# Patient Record
Sex: FEMALE | Race: WHITE
[De-identification: names, ages, dates, MRNs, and addresses within clinical notes are randomized per-mention and may not be internally consistent; named-entity substitution may affect disease eponyms.]

---

## 2016-12-26 NOTE — DIAGNOSTIC IMAGING REPORT
CT OF THE HEAD WITHOUT CONTRAST



CLINICAL HISTORY: Overdose.    



COMPARISON STUDY:  Head CT September 2, 2016. 



CT DOSE: 601.98 mGy.cm



TECHNIQUE: Helical axial images of the head were obtained without IV contrast.

Automated exposure control was utilized for the study.



FINDINGS: No acute intracranial hemorrhage, midline shift or mass effect is

present. Ventricular system is normal. The basilar cisterns are patent. There

are no extra-axial collections. Gray-white differentiation is maintained. There

are no findings to suggest acute dural sinus thrombosis or acute territorial

infarct. There is no calvarial fracture. Visualized portions of the sinuses and

mastoid air cells are clear.



IMPRESSION:  No acute intracranial findings. 







Electronically signed by:  Riky Leija M.D.

12/26/2016 7:03 PM

## 2016-12-26 NOTE — DIAGNOSTIC IMAGING REPORT
CHEST ONE VIEW PORTABLE



CLINICAL HISTORY: Overdose.    



COMPARISON STUDY:  Chest CT October 9, 2011



FINDINGS: There are cholecystectomy clips. No pneumothorax or pleural effusion

is present. No consolidation is identified and there is no evidence of pulmonary

edema. Cardiomediastinal silhouette is normal. 



IMPRESSION:  No acute cardiopulmonary findings. 









Electronically signed by:  Riky Leija M.D.

12/26/2016 6:23 PM
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
## 2016-12-27 NOTE — NEUROLOGY CONSULTATION
Neurology Consultation


Date of Consultation:


Dec 27, 2016.


Attending Physician:


Kaci Logan MD


Primary Care Physician:


Keo Carr D.O.


Reason for Consultation:


chronic headache


History of Present Illness


Source:  patient


Sussy is seen in the psychiatry unit for recurrent depression, PTSD, 

generalized anxiety and panic disorder as well as cannabis and opiate use 

disorders. She is diagnosed with personality disorder. She was seen in the ED 

due to an overdose attempt on  Ativan.  She had attempted to swallow about 20 

tablets and then her boyfriend grabbed the bottle and then she spit out the 

ones in her mouth. She feels the chronic headaches an pain are not tolerable 

and she can't live this way. She last saw Dr Vargas in Neurology in September 

and no new medications were started. She has had the most relief from the 

combination of Ultram 50 mg and Robomol 750 QID. She states last month the 

Robomol was changed to baclofen which she thinks does not help. Dr Vargas had 

set her up for pain mgt and EEG for further management but she didn't follow 

through with the visits stating she was in too much pain to go to the 

appointments. She states the steroids were tried in the past with little 

effect. It actually escalated the psychiatric issues. Currently she has a 

headache that is stabbing behind her eye and it wraps around to the front of 

her face and jaw. She states she fell 2 months ago and hit her head 3 times 

while falling down the stairs. denies CP, SOB, abdominal pain, numbness tingling

, swallowing issues. +vision changes (blurred) but she does not have her glasses

, headaches behind left eye, + generalized pain, + 20 pound weight loss.





Past Medical/Surgical History


Medical Problems:


(1) Chronic pain


Status: Acute  





(2) Chronic pain


Status: Acute  





(3) Depression


Status: Acute  





(4) Migraine


Status: Acute  





(5) Post traumatic stress disorder


Status: Acute  





(6) Serous otitis media


Status: Acute  





(7) Suicidal ideation


Status: Acute  





(8) Suicidal ideation


Status: Acute  





(9) Suicide gesture


Status: Acute  











Social History


Smoking Status:  Never smoker


Drug Use:  none


Marital Status:  single


Occupation Status:  [employer] student





Allergies


Coded Allergies:  


     Roaring Spring (Verified  Allergy, Severe, VOMITING,ITCHY TONGUE,FACE SWELLING, 12/ 27/16)


     Macadamia Nut Oil (Verified  Allergy, Severe, RASH,VOMITING,ITCHY TONGUE,

FACE SWELLING, 12/27/16)


     Valproic Acid (Verified  Allergy, Severe, TONGUE SWELLING, 12/27/16)


     Amoxicillin (Verified  Allergy, Intermediate, BREAKOUT, 12/27/16)


     Ciprofloxacin (Verified  Allergy, Intermediate, WELTS/SWELLING, 12/27/16)


     Clavulanic Acid (Verified  Allergy, Intermediate, BREAKOUT, 12/27/16)


     Pineapple (Verified  Allergy, Intermediate, VOMITING, ITCHY TONGUE, 12/27/ 16)


     Iodine (Verified  Adverse Reaction, Intermediate, VOMITING, 12/27/16)


     Erythromycin (Verified  Adverse Reaction, Mild, NAUSEA, 12/26/16)


     Iodinated Contrast Media (Verified  Adverse Reaction, Mild, VOMITING, 12/27 /16)





Current Inpatient Medications





 Current Inpatient Medications








 Medications


  (Trade)  Dose


 Ordered  Sig/Evelia


 Route  Start Time


 Stop Time Status Last Admin


Dose Admin


 


 Acetaminophen


  (Tylenol Tab)  650 mg  Q4H  PRN


 PO  12/27/16 02:30


 1/26/17 02:29   


 


 


 Al Hydroxide/Mg


 Hydroxide


  (Maalox Susp)  30 ml  Q4H  PRN


 PO  12/27/16 02:30


 1/26/17 02:29   


 


 


 Bismuth


 Subsalicylate


  (Kaopectate Liqd)  15 ml  DAILY  PRN


 PO  12/27/16 02:30


 1/26/17 02:29   


 


 


 Magnesium


 Hydroxide


  (Milk Of


 Magnesia Susp)  30 ml  DAILY  PRN


 PO  12/27/16 02:30


 1/26/17 02:29   


 


 


 Sodium Chloride


  (Ocean Nasal


 Spray)    PRN  PRN


 NA  12/27/16 02:30


 1/26/17 02:29   


 


 


 Hydroxyzine HCl


  (Vistaril Tab)  50 mg  HSZ  PRN


 PO  12/27/16 02:30


 1/26/17 02:29   


 


 


 Hydroxyzine HCl


  (Vistaril Tab)  25 mg  Q4H  PRN


 PO  12/27/16 02:30


 1/26/17 02:29   


 


 


 Prazosin HCl


  (Prazosin)  1 mg  HS


 PO  12/27/16 22:00


 1/26/17 21:59   


 


 


 Ondansetron HCl


  (Zofran Tab)  4 mg  Q6H  PRN


 PO  12/27/16 02:30


 1/26/17 02:29   


 


 


 Baclofen


  (Lioresal Tab)  10 mg  TID


 PO  12/27/16 09:00


 1/26/17 08:59  12/27/16 09:38


10 MG


 


 Tramadol HCl


  (Ultram Tab)  50 mg  Q6H  PRN


 PO  12/27/16 02:30


 1/26/17 02:29  12/27/16 09:43


50 MG


 


 Haloperidol


 Lactate


  (Haldol Inj)  5 mg  Q4  PRN


 IM  12/27/16 09:30


 1/26/17 09:29   


 


 


 Haloperidol


  (Haldol Tab)  5 mg  Q4H  PRN


 PO  12/27/16 09:30


 1/26/17 09:29   


 


 


 Lorazepam


  (Ativan Inj)  1 mg  Q4H  PRN


 IM  12/27/16 09:30


 1/26/17 09:29   


 


 


 Lorazepam


  (Ativan Tab)  1 mg  Q4  PRN


 PO  12/27/16 09:30


 1/26/17 09:29   


 











Physical Exam


Vital Signs (Past 24 Hrs):











  Date Time  Temp Pulse Resp B/P Pulse Ox O2 Delivery O2 Flow Rate FiO2


 


12/27/16 06:50 36.5 86 16 106/67    


 


12/27/16 00:45 36.9 105 18 107/68    


 


12/27/16 00:35  64 16 134/79 95 Room Air  


 


12/26/16 23:11  64      


 


12/26/16 19:54  65 18 108/54 96 Room Air  


 


12/26/16 19:42  65      


 


12/26/16 18:53     99 Room Air  


 


12/26/16 18:53  70 18 113/53 99 Room Air  


 


12/26/16 17:20 37.1 81 18 124/81 99 Room Air  








Physical Exam:


Constitutional:  appearance nourished, healthy and normal


Ears, Nose, Mouth and Throat: mucous membranes moist, no injection and skin 

normal, eyes normal


Cardiovascular: normal S-1 and S-2 and regular rate and rhythm


Respiratory: clear to auscultation (CTA) and no rales, rhonchi or wheeze


Musculoskeletal: no peripheral edema and good distal pulses


Skin: no stigmata of neurocutaneous disease noted and normal and intact


Eyes: extraocular muscles intact (EOMI) and pupils equal, round and reactive to 

light (PERRL), pupils dilated, good vascular pulsation disc flat





NEUROLOGIC EXAMINATION: 


Mental status: 


Alert and interactive


Oriented to full date and location


Oriented to person


Speech fluent with no evidence of aphasia





Cranial Nerves


smile eye brow raise symmetric, tongue midline





Reflexes:


Deep tendon reflexes were symmetrical and graded 2/5.  Plantar responses were 

flexor.





Sensory: 


no sensory deficits with vibration, cool touch





Coordination: 


Romberg absent





Gait/Stance:


Posture normal.  Gait normal: with steady with steps, base, turning, and tandem 

gait.





Motor:


Negative for pronator drift of out stretched arms with eyes closed.





Strength:


biceps triceps hand  4/5 bilaterally (no good effort) hip flex bilaterally 4

/5 halted by pain, plantar flex ext bilaterally 5/5





Laboratory Results


Past 24 Hours:


12/26/16 17:50








Red Blood Count 4.67, Mean Corpuscular Volume 89.1, Mean Corpuscular Hemoglobin 

30.2, Mean Corpuscular Hemoglobin Concent 33.9, Mean Platelet Volume 9.6, 

Neutrophils (%) (Auto) 86.8, Lymphocytes (%) (Auto) 8.4, Monocytes (%) (Auto) 

4.2, Eosinophils (%) (Auto) 0.2, Basophils (%) (Auto) 0.2, Neutrophils # (Auto) 

11.22, Lymphocytes # (Auto) 1.09, Monocytes # (Auto) 0.54, Eosinophils # (Auto) 

0.02, Basophils # (Auto) 0.03





12/26/16 17:50

















Test


  12/26/16


17:50 12/26/16


17:52 12/26/16


17:55 12/26/16


17:57


 


White Blood Count


  12.92 K/uL


(4.8-10.8) 


  


  


 


 


Red Blood Count


  4.67 M/uL


(4.2-5.4) 


  


  


 


 


Hemoglobin


  14.1 g/dL


(12.0-16.0) 


  


  


 


 


Hematocrit 41.6 % (37-47)    


 


Mean Corpuscular Volume


  89.1 fL


() 


  


  


 


 


Mean Corpuscular Hemoglobin


  30.2 pg


(25-34) 


  


  


 


 


Mean Corpuscular Hemoglobin


Concent 33.9 g/dl


(32-36) 


  


  


 


 


Platelet Count


  307 K/uL


(130-400) 


  


  


 


 


Mean Platelet Volume


  9.6 fL


(7.4-10.4) 


  


  


 


 


Neutrophils (%) (Auto) 86.8 %    


 


Lymphocytes (%) (Auto) 8.4 %    


 


Monocytes (%) (Auto) 4.2 %    


 


Eosinophils (%) (Auto) 0.2 %    


 


Basophils (%) (Auto) 0.2 %    


 


Neutrophils # (Auto)


  11.22 K/uL


(1.4-6.5) 


  


  


 


 


Lymphocytes # (Auto)


  1.09 K/uL


(1.2-3.4) 


  


  


 


 


Monocytes # (Auto)


  0.54 K/uL


(0.11-0.59) 


  


  


 


 


Eosinophils # (Auto)


  0.02 K/uL


(0-0.5) 


  


  


 


 


Basophils # (Auto)


  0.03 K/uL


(0-0.2) 


  


  


 


 


RDW Standard Deviation


  43.6 fL


(36.4-46.3) 


  


  


 


 


RDW Coefficient of Variation


  13.5 %


(11.5-14.5) 


  


  


 


 


Immature Granulocyte % (Auto) 0.2 %    


 


Immature Granulocyte # (Auto)


  0.02 K/uL


(0.00-0.02) 


  


  


 


 


Anion Gap


  11.0 mmol/L


(3-11) 


  


  


 


 


Est Creatinine Clear Calc


Drug Dose 102.2 ml/min 


  


  


  


 


 


Estimated GFR (


American) 93.3 


  


  


  


 


 


Estimated GFR (Non-


American 80.5 


  


  


  


 


 


BUN/Creatinine Ratio 13.8 (10-20)    


 


Osmolality


  290 mOsm/kg


(280-300) 


  


  


 


 


Calcium Level


  8.5 mg/dl


(8.5-10.1) 


  


  


 


 


Total Bilirubin


  0.7 mg/dl


(0.2-1) 


  


  


 


 


Direct Bilirubin


  0.1 mg/dl


(0-0.2) 


  


  


 


 


Aspartate Amino Transf


(AST/SGOT) 12 U/L (15-37) 


  


  


  


 


 


Alanine Aminotransferase


(ALT/SGPT) 14 U/L (12-78) 


  


  


  


 


 


Alkaline Phosphatase


  63 U/L


() 


  


  


 


 


Total Creatine Kinase


  34 U/L


() 


  


  


 


 


Creatine Kinase MB


  < 0.5 ng/ml


(0.5-3.6) 


  


  


 


 


Creatine Kinase MB Ratio  (0-3.0)    


 


Troponin I


  < 0.015 ng/ml


(0-0.045) 


  


  


 


 


Total Protein


  7.9 gm/dl


(6.4-8.2) 


  


  


 


 


Albumin


  4.1 gm/dl


(3.4-5.0) 


  


  


 


 


Lipase


  124 U/L


() 


  


  


 


 


Human Chorionic Gonadotropin,


Qual NEG (NEG) 


  


  


  


 


 


Salicylates Level


  < 1.7 mg/dl


(2.8-20) 


  


  


 


 


Acetaminophen Level


  < 2 ug/ml


(10-30) 


  


  


 


 


Ethyl Alcohol mg/dL


  < 3.0 mg/dl


(0-3) 


  


  


 


 


Prothrombin Time


  


  10.4 SECONDS


(9.0-12.0) 


  


 


 


Prothromb Time International


Ratio 


  1.0 (0.9-1.1) 


  


  


 


 


Activated Partial


Thromboplast Time 


  25.8 SECONDS


(21.0-31.0) 


  


 


 


Partial Thromboplastin Ratio  1.0   


 


Urine Color   YELLOW  


 


Urine Appearance   CLEAR (CLEAR)  


 


Urine pH   6.0 (4.5-7.5)  


 


Urine Specific Gravity


  


  


  1.011


(1.000-1.030) 


 


 


Urine Protein   NEG (NEG)  


 


Urine Glucose (UA)   NEG (NEG)  


 


Urine Ketones   NEG (NEG)  


 


Urine Occult Blood   NEG (NEG)  


 


Urine Nitrite   NEG (NEG)  


 


Urine Bilirubin   NEG (NEG)  


 


Urine Urobilinogen   NEG (NEG)  


 


Urine Leukocyte Esterase   NEG (NEG)  


 


Urine Opiates Screen   NEG (NEG)  


 


Urine Methadone, Qualitative   NEG (NEG)  


 


Urine Barbiturates   NEG (NEG)  


 


Urine Phencyclidine (PCP)


Level 


  


  NEG (NEG) 


  


 


 


Ur


Amphetamine/Methamphetamine 


  


  NEG (NEG) 


  


 


 


MDMA (Ecstasy) Screen   NEG (NEG)  


 


Urine Benzodiazepines Screen   NEG (NEG)  


 


Urine Cocaine Metabolite   NEG (NEG)  


 


Urine Marijuana (THC)   POS (NEG)  











Imaging


CT head- No acute intracranial findings.





Impression


41 year old female with psychiatric issues, chronic pain, migraine headaches





Plan


1. no further imaging needed- full work up as outpatient for headaches


2. adding additional medication (as she had tried most available) does not seem 

like a good option at this point


3. EEG - to r/o any seizure focus to headaches and facial phenomenon


4. pain mgt -would recommend injection of trigger points or Botox may be an 

option


5. possible referral to headache specialist as an out patient


6. further recommendations to follow. 





I have seen and discussed above patient with Dr Isreal Vargas, neurology





I have seen this patient on a single outpatient visit in September and felt 

than that her chronic headache issue would not be solved by yet another 

medication trial  ( she has been on over 40 medications for treatment of her 

depression and headache issues and to date none has been effective ( other than 

self medication with marijuana ) and she has multiple comorbidities as well 

that interfere with the medical model of headache treatment   I was planning to 

have her evaluated for potential injection management  ( possibly botox ) and 

was going to get an eeg to readdress some of the potential seizure disorder 

questions that have been raised periodically but these plans never came to 

fruition due to multiple other issues not the least wof which was a mild closed 

head trauma in the mid fall of this year.  I felt that she was going to need a 

tertiary level of care but her options for this are somewhat limited by 

insurance and by access within [facility] [employer] system  At this time agree with 

having pain management evaluate her, getting an eeg, and considering a trial fo 

hig dose then rapidly tapering steroids if psychiatry would feel it safe in 

this iinpatient setting  We will drop by tomorrow to assess ow things have 

evolved and will read the eeg when done  I agree with the plans outlined by 

Parul JAUREGUI and have discussed them with her  Israel Vargas MD
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
## 2016-12-27 NOTE — EMERGENCY ROOM VISIT NOTE
ED Visit Note


First contact with patient:  23:29


41 yr old female medically cleared and signed out to me by Dr Godoy awaiting 

[facility] placement following attempted overdose on her ativan witnessed by 

significant other.  Evaluated at sign out and getting evening medications.  [facility] accepted her to their facility and I signed 201.  She was stable and 

transferred up there without issue.

## 2016-12-28 NOTE — ELECTROENCEPHALOGRAPH REPORT
CLINICAL DIAGNOSIS:  Chronic daily increasingly severe headaches, vague

history of syncopal events in the past.  Question seizures.

 

EEG DIAGNOSIS:  Essentially normal during wakefulness.

 

DESCRIPTION OF TRACING:  This EEG was done as a bedside recording with

simultaneous video analysis of patient's movement and behavior.  The tracing

is of good technical quality with few or no muscle or movement artifacts. 

Photic stimulation was performed.

 

During wakefulness, there is evidence for a normal background rhythm in the

alpha range of up to 10 Hz of maximum frequency and 30 microvolts of maximum

amplitude.  This is maximum in posterior head regions bilaterally

symmetrical.  Polymorphic mid to lower frequency theta activity is seen over

all head regions without clear focal or regional predominance.  Anterior head

region maximum bilaterally symmetrical low voltage fast activity in the beta

range is present.

 

Photic stimulation provokes a modest driving response without a photomyogenic

or photoparoxysmal  component.

 

At no time during the waking tracing is there evidence for potentially

epileptogenic activity in the form of polyspike or spike wave bursts, focal

sharp waves or focal spikes.

 

INTERPRETATION:  This EEG is essentially normal during wakefulness without

evidence for a focal or generalized encephalopathy and without evidence for

potentially epileptogenic activity.

## 2016-12-28 NOTE — OPERATIVE NOTE-PAIN MANAGEMENT
Pain Clinic Operative Note


GREATER AND LESSER OCCIPITAL NERVE BLOCKS, AURICULOTEMPORAL, SUPRAORBITAL, AND 

SUPRATROCHLEAR NERVE BLOCKS PROCEDURE NOTE





Diagnosis: Chronic Headache Disorder


Side: Right Greater and Lesser Occipital Nerve blocks


Left Auriculotemporal, Supraorbital/Supratrochlear, and CNV3 (Mandibular branch

) nerve blocks   


Surgeon: Dr. Em Hahn


Anesthesia: none


Material forwarded to lab: none





The patient was counseled on the risks, benefits of the procedure and agrees to 

proceed.  No sign of infection at site of needle insertion.  Consent was 

obtained and witnessed.  Time out was performed.  Standard vital sign monitors 

were placed on the patient.  A syringe containing a mixture of 12 ml 9ml 0.5%

bupivacaine PF and 40mg kenalog and was placed on a 25G needle.  Skeletal 

landmarks identified the greater and lesser occipital nerve on the appropriate 

side of injection.  Then alcohol was utilized for skin preparation.  After 

negative aspiration for blood 2ml were injected in a fan like fashion at each 

site.  Next, the auriculotemporal nerve was identified at 1 ml of the above mix 

was injected after negative aspiration for blood.  Then the supraorbital and 

supratrochlear nerves were identified and 0.5ml of the above mix was injected 

at each site after negative aspiration for blood.  Finally, the left mandibular 

branch of CN V3 was blocked with 1ml of above solution in the same manner.  All 

questions were answered prior to departure and report was given to the floor RN.


I attest to the content of the Intraoperative Record and any orders documented 

therein.  Any exceptions are noted below.

## 2016-12-28 NOTE — PSYCHIATRIC PROGRESS NOTES
Progress Note


Date of Service


Dec 28, 2016.





Interval History


40 yo female admitted voluntarily on 12/27/16 with severe depression and 

suicidality, having attempted to OD on ativan but was stopped by her boyfriend.

  She has  along history of mental health treatment for PTSD, depression and 

borderline personality disorder.





Chief Complaint


"I have a lot of pain".





Subjective


Patient was seen & assessed interval progress reviewed with Treatment Team.  

The patient is focused on reports of pain again today.  She was seen by pain 

management this AM, receiving injections for chronic headaches.  At the time of 

my interview she says that the headache is slightly improved, but mostly feels 

"numb" from the injection.  She says that her mood is "Sad.  I have a lot on my 

plate." and goes on to talk about the rift between she and her mother over her 

father's estate distribution.   She feels that her mother is disregarding father

's wishes for Sussy to have the house and "keeping it all for herself".  She 

talks at length of her pain in various parts of her body, having had "42 trials 

of antidepressants", "20 rounds of PT", "I need 27 surgeries, but can't have 

them because I won't heal from them", "no one knows the pain I'm in". She 

returns to the room after the interview to say that she was angry with me for 

not validating her pain and only with great effort, was she able to see that my 

focus is on what she can control now, and less about reviewing all of the 

things that she can't control.  With this discussion she was able to calm down, 

and agreed that art is something that she can control and has been therapeutic 

for her in the past.   She became quite childlike after this discussion, with a 

pouting affect and demeanor.  She denies acute SI, but has conditional SI 

saying "if my pain doesn't go away" then life is not worth living.





Review of Systems


Constitutional:  + fatigue


ENT:  No dental problems, No hearing loss, No nasal symptoms, No problem 

reported, No sore throat, No tinnitus, No trouble swallowing, No unusual 

epistaxis


Respiratory:  No cough, No dyspnea at rest, No dyspnea on exertion, No 

hemoptysis, No problem reported, No shortness of breath, No sputum, No wheezing


Cardiovascular:  No PND, No chest pain, No claudication, No edema, No orthopnea

, No palpitations, No problem reported


Abdomen:  No GI bleeding, No constipation, No diarrhea, No nausea, No pain, No 

problem reported, No vomiting


Musculoskeletal:  + joint pain (ankles, knees, shoulders, back headache)


Neurologic:  No balance problems, No memory loss, No numbness/tingling, No 

paralysis, No problem reported, No vertigo, No weakness


Psychiatric:  + depression symptoms


Integumentary:  No bleeding, No color change, No itch, No new/changing skin 

lesions, No problem reported, No rash





Sleep Information


Total Hours of Sleep:  5.25





Meal Information


Percent of Breakfast Consumed:  0


Percent of Lunch Consumed:  0


Percent of Dinner Consumed:  100





Mental Status Exam


During interview pt is:  alert and oriented


Appearance:  appropriately groomed, disheveled (still in hospital gowns)


Eye contact is:  good


Motor behavior is:  steady gait & station


Speech:  loud (and at times rapid)


Affect:  labile, irritable


Mood is:  depressed, irritable


Thought process:  perseveration (regarding pain issues)


Thought content:  reality based without delusions


Suicidal thought are:  present (but conditional)


Homicidal thoughts are:  denied


Hallucinations:  denies auditory, denies visual


Cognition:  memory grossly intact, attention grossly intact


Intelligence estimated to be:  average


Insight:  impaired


Judgement:  impaired





Medication Trials





(1) past psych meds


Prozac, paxil, lithium, depakote, risperdal, zyprexa, lamictal, sertraline, 

amitriptyline, cymbalta  Last Edited By: Zhanna Fuchs on Dec 28, 2016 10:46





Impression


The patient remains irritable and focused on her pain.  She is difficult to 

redirect away from her focus on talking about all that has occurred in the past

, and the things that she is unable to achieve.   She continues to refuse a 

trial of an antidepressant, and specifically says that Cymbalta caused her BP 

to go up.  In terms of her BPD, we will need to be consistent with her in our 

approach, and work to help her feel safe in the milieu to prevent her from 

further decompensation.  We will need to set up a family meeting and have yet 

to obtain records from OP providers although she did sign releases today.  CYS 

report was made yesterday in deference to the fact that she attempted the OD in 

the presence of her child.  Will encourage group and individual therapy.





Continued Inpatient Care


The patient continues to require in patient care due to the severity of her 

condition, and the risk for self harm if discharged.





Plan





(1) Major depressive disorder, recurrent severe without psychotic features


12/28


   - Q 15 min checks for safety


   - Encourage participation in group and individual counseling


   - Obtain OP records from [place] and from her therapist


   - Recommend family meeting with those with whom she lives. 


   - Patient currently refusing a trial of ADM's


   - Coordinate aftercare with current providers


   - Assist the patient to learn and utilize healthy coping strategies


   - Safety plan





(2) Chronic post-traumatic stress disorder (PTSD)


12/28/16


   - Encourage healthy coping strategies


   - Individual and group therapies





(3) SIERRA (generalized anxiety disorder)


12/28


   - Ativan DC'd s/p OD and will need to coordinate use with OP provider


   -  Assist the patient to learn and utilize healthy coping strategies


   - Vistaril prn for anxiety or sleep


   - Encourage mindfulness, deep breathing, relaxation and exercise





(4) Panic disorder with agoraphobia


12/28


   - see interventions for SIERRA





(5) Borderline personality disorder


12/28


   - Be consistent in all boundaries


   - Assist the patient to have good boundaries with peers


   - Encourage healthy coping strategies, and discourage dysfunctional ones


   - Coordinate with OP therapist





(6) Cannabis abuse


12/28


   - Recommend abstinence


   - Educate about the negative impact of substances to mood








Discharge / Aftercare Planning


Primary Care Physician:  


   Name:  Dr Carr


   Phone Number:  [phone]


Psychiatrist:  


   Name:  Leigha Dalton


Therapist:  


   Name:  Tati Lai


:  


   Name:  .none





Visit Code


E&M Code:  23116





Risk Factors Assessment


:  Yes


/single/:  Yes


Higher / Fall in social status:  No


Access to guns:  No


Health problems:  Yes


Mental Health Diagnoses:  Yes


Substance use disorders:  Yes


Previous attempt:  Yes


Previous psychiatric stay:  Yes


Hopelessness:  Yes


Smoker:  No





Protective Factors Assessment


Gnosticist beliefs:  No


:  No


Responsible for young children:  Yes


Employed:  No


Stable relationships:  No


Supportive family:  No


Good rapport with provider:  Yes





Data


Vital Signs Last 24 Hrs:











  Date Time  Temp Pulse Resp B/P Pulse Ox O2 Delivery O2 Flow Rate FiO2


 


12/28/16 09:09  74 16 129/84    


 


12/28/16 06:09 36.9 75 16 93/57    





  104  117/77    








Meds Administered Last 24 Hrs:





Meds Administered (Past 24Hrs)








 Medications


  (Trade)  Dose


 Ordered  Sig/Evelia


 Route  Start Time


 Stop Time Status Last Admin


Dose Admin


 


 Sodium Chloride


  (Nss 1000ml)  1,000 ml @ 


 999 mls/hr  Q1H1M STAT


 IV  12/26/16 17:30


 12/26/16 18:30 DC 12/26/16 18:28


999 MLS/HR


 


 Lorazepam


  (Ativan Inj)  1 mg  NOW  STAT


 IV  12/26/16 17:30


 12/26/16 17:31 DC 12/26/16 18:28


1 MG


 


 Tramadol HCl


  (Ultram Tab)  50 mg  NOW  STAT


 PO  12/26/16 22:10


 12/26/16 22:11 DC 12/26/16 22:19


50 MG


 


 Baclofen


  (Lioresal Tab)  20 mg  ONE  STAT


 PO  12/26/16 22:10


 12/26/16 22:11 DC 12/26/16 22:18


20 MG


 


 Hydroxyzine HCl


  (Vistaril Tab)  50 mg  NOW  STAT


 PO  12/26/16 23:30


 12/26/16 23:32 DC 12/26/16 23:40


50 MG


 


 Prazosin HCl


  (Prazosin)  1 mg  ONE  STAT


 PO  12/26/16 23:44


 12/26/16 23:45 DC 12/27/16 00:07


1 MG


 


 Hydroxyzine HCl


  (Vistaril Tab)  50 mg  HSZ  PRN


 PO  12/27/16 02:30


 1/26/17 02:29  12/27/16 22:07


50 MG


 


 Prazosin HCl


  (Prazosin)  1 mg  HS


 PO  12/27/16 22:00


 1/26/17 21:59  12/27/16 22:04


1 MG


 


 Baclofen


  (Lioresal Tab)  10 mg  TID


 PO  12/27/16 09:00


 1/26/17 08:59  12/28/16 09:20


10 MG


 


 Tramadol HCl


  (Ultram Tab)  50 mg  Q6H  PRN


 PO  12/27/16 02:30


 1/26/17 02:29  12/27/16 22:08


50 MG








Lab Results Last 24 Hrs:


12/26/16 17:50








Red Blood Count 4.67, Mean Corpuscular Volume 89.1, Mean Corpuscular Hemoglobin 

30.2, Mean Corpuscular Hemoglobin Concent 33.9, Mean Platelet Volume 9.6, 

Neutrophils (%) (Auto) 86.8, Lymphocytes (%) (Auto) 8.4, Monocytes (%) (Auto) 

4.2, Eosinophils (%) (Auto) 0.2, Basophils (%) (Auto) 0.2, Neutrophils # (Auto) 

11.22, Lymphocytes # (Auto) 1.09, Monocytes # (Auto) 0.54, Eosinophils # (Auto) 

0.02, Basophils # (Auto) 0.03





12/26/16 17:50

















Test


  12/26/16


17:50 12/26/16


17:52 12/26/16


17:55 12/26/16


17:57


 


White Blood Count


  12.92 K/uL


(4.8-10.8) 


  


  


 


 


Red Blood Count


  4.67 M/uL


(4.2-5.4) 


  


  


 


 


Hemoglobin


  14.1 g/dL


(12.0-16.0) 


  


  


 


 


Hematocrit 41.6 % (37-47)    


 


Mean Corpuscular Volume


  89.1 fL


() 


  


  


 


 


Mean Corpuscular Hemoglobin


  30.2 pg


(25-34) 


  


  


 


 


Mean Corpuscular Hemoglobin


Concent 33.9 g/dl


(32-36) 


  


  


 


 


Platelet Count


  307 K/uL


(130-400) 


  


  


 


 


Mean Platelet Volume


  9.6 fL


(7.4-10.4) 


  


  


 


 


Neutrophils (%) (Auto) 86.8 %    


 


Lymphocytes (%) (Auto) 8.4 %    


 


Monocytes (%) (Auto) 4.2 %    


 


Eosinophils (%) (Auto) 0.2 %    


 


Basophils (%) (Auto) 0.2 %    


 


Neutrophils # (Auto)


  11.22 K/uL


(1.4-6.5) 


  


  


 


 


Lymphocytes # (Auto)


  1.09 K/uL


(1.2-3.4) 


  


  


 


 


Monocytes # (Auto)


  0.54 K/uL


(0.11-0.59) 


  


  


 


 


Eosinophils # (Auto)


  0.02 K/uL


(0-0.5) 


  


  


 


 


Basophils # (Auto)


  0.03 K/uL


(0-0.2) 


  


  


 


 


RDW Standard Deviation


  43.6 fL


(36.4-46.3) 


  


  


 


 


RDW Coefficient of Variation


  13.5 %


(11.5-14.5) 


  


  


 


 


Immature Granulocyte % (Auto) 0.2 %    


 


Immature Granulocyte # (Auto)


  0.02 K/uL


(0.00-0.02) 


  


  


 


 


Anion Gap


  11.0 mmol/L


(3-11) 


  


  


 


 


Est Creatinine Clear Calc


Drug Dose 102.2 ml/min 


  


  


  


 


 


Estimated GFR (


American) 93.3 


  


  


  


 


 


Estimated GFR (Non-


American 80.5 


  


  


  


 


 


BUN/Creatinine Ratio 13.8 (10-20)    


 


Osmolality


  290 mOsm/kg


(280-300) 


  


  


 


 


Calcium Level


  8.5 mg/dl


(8.5-10.1) 


  


  


 


 


Total Bilirubin


  0.7 mg/dl


(0.2-1) 


  


  


 


 


Direct Bilirubin


  0.1 mg/dl


(0-0.2) 


  


  


 


 


Aspartate Amino Transf


(AST/SGOT) 12 U/L (15-37) 


  


  


  


 


 


Alanine Aminotransferase


(ALT/SGPT) 14 U/L (12-78) 


  


  


  


 


 


Alkaline Phosphatase


  63 U/L


() 


  


  


 


 


Total Creatine Kinase


  34 U/L


() 


  


  


 


 


Creatine Kinase MB


  < 0.5 ng/ml


(0.5-3.6) 


  


  


 


 


Creatine Kinase MB Ratio  (0-3.0)    


 


Troponin I


  < 0.015 ng/ml


(0-0.045) 


  


  


 


 


Total Protein


  7.9 gm/dl


(6.4-8.2) 


  


  


 


 


Albumin


  4.1 gm/dl


(3.4-5.0) 


  


  


 


 


Lipase


  124 U/L


() 


  


  


 


 


Human Chorionic Gonadotropin,


Qual NEG (NEG) 


  


  


  


 


 


Salicylates Level


  < 1.7 mg/dl


(2.8-20) 


  


  


 


 


Acetaminophen Level


  < 2 ug/ml


(10-30) 


  


  


 


 


Ethyl Alcohol mg/dL


  < 3.0 mg/dl


(0-3) 


  


  


 


 


Prothrombin Time


  


  10.4 SECONDS


(9.0-12.0) 


  


 


 


Prothromb Time International


Ratio 


  1.0 (0.9-1.1) 


  


  


 


 


Activated Partial


Thromboplast Time 


  25.8 SECONDS


(21.0-31.0) 


  


 


 


Partial Thromboplastin Ratio  1.0   


 


Urine Color   YELLOW  


 


Urine Appearance   CLEAR (CLEAR)  


 


Urine pH   6.0 (4.5-7.5)  


 


Urine Specific Gravity


  


  


  1.011


(1.000-1.030) 


 


 


Urine Protein   NEG (NEG)  


 


Urine Glucose (UA)   NEG (NEG)  


 


Urine Ketones   NEG (NEG)  


 


Urine Occult Blood   NEG (NEG)  


 


Urine Nitrite   NEG (NEG)  


 


Urine Bilirubin   NEG (NEG)  


 


Urine Urobilinogen   NEG (NEG)  


 


Urine Leukocyte Esterase   NEG (NEG)  


 


Urine Opiates Screen   NEG (NEG)  


 


Urine Methadone, Qualitative   NEG (NEG)  


 


Urine Barbiturates   NEG (NEG)  


 


Urine Phencyclidine (PCP)


Level 


  


  NEG (NEG) 


  


 


 


Ur


Amphetamine/Methamphetamine 


  


  NEG (NEG) 


  


 


 


MDMA (Ecstasy) Screen   NEG (NEG)  


 


Urine Benzodiazepines Screen   NEG (NEG)  


 


Urine Cocaine Metabolite   NEG (NEG)  


 


Urine Marijuana (THC)   POS (NEG)

## 2016-12-28 NOTE — NEUROLOGY PROGRESS NOTES
Neurology Progress Note


Date of Service


Dec 28, 2016.





Mell Hi is seen in the psychiatry unit for recurrent depression, PTSD, 

generalized anxiety and panic disorder as well as cannabis and opiate use 

disorders. She is diagnosed with personality disorder. She was seen in the ED 

due to an overdose attempt on  Ativan.  She had attempted to swallow about 20 

tablets and then her boyfriend grabbed the bottle and then she spit out the 

ones in her mouth. She feels the chronic headaches an pain are not tolerable 

and she can't live this way. She last saw Dr Vargas in Neurology in September 

and no new medications were started. She has had the most relief from the 

combination of Ultram 50 mg and Robomol 750 QID. She states last month the 

Robomol was changed to baclofen which she thinks does not help. Dr Vargas had 

set her up for pain mgt and EEG for further management but she didn't follow 

through with the visits stating she was in too much pain to go to the 

appointments. She states the steroids were tried in the past with little 

effect. It actually escalated the psychiatric issues. Currently she has a 

headache that is stabbing behind her eye and it wraps around to the front of 

her face and jaw. She states she fell 2 months ago and hit her head 3 times 

while falling down the stairs. 


Today her boyfriend is in the room. She states pain management gave her 

craniocervical injection which felt better right after they started them but 

now it is hurting worse. She had the EEG this am also and Dr Vargas with see 

her later and give her the results. denies CP, SOB, abdominal pain, N, V. +

headache, back pain, leg pain. Her boyfriend has brought in her TENS unit and 

she states that does help.





Objective











  Date Time  Temp Pulse Resp B/P Pulse Ox O2 Delivery O2 Flow Rate FiO2


 


12/28/16 09:09  74 16 129/84    


 


12/28/16 06:09 36.9 75 16 93/57    





  104  117/77    








no new labs


Imaging:


EEG pending read


Exam:


Physical Exam:


Constitutional:  appearance nourished, healthy and normal


Ears, Nose, Mouth and Throat: mucous membranes moist, no injection and skin 

normal, eyes normal


Cardiovascular: normal S-1 and S-2 and regular rate and rhythm


Respiratory: clear to auscultation (CTA) and no rales, ronchi or wheeze


Musculoskeletal: no peripheral edema and good distal pulses


Skin: no stigmata of neurocutaneous disease noted and normal and intact


Eyes: extraocular muscles intact (EOMI) and pupils equal, round and reactive to 

light (PERRL)





NEUROLOGIC EXAMINATION: 


Mental status: 


Alert and interactive


Oriented to full date and location


Oriented to person


Speech fluent with no evidence of aphasia, when asked to discuss pain and 

management or other topics appears to be relaxed. 





Cranial Nerves


facial symmetry





Coordination: 


finger to nose with no bi pass





Gait/Stance:


lying in bed but moving with no assistance.





Strength:


hand  biceps triceps bilaterally 5/5, hip flex plantar flex ext 5/5 

bilaterally





 Current Inpatient Medications








 Medications


  (Trade)  Dose


 Ordered  Sig/Evelia


 Route  Start Time


 Stop Time Status Last Admin


Dose Admin


 


 Acetaminophen


  (Tylenol Tab)  650 mg  Q4H  PRN


 PO  12/27/16 02:30


 1/26/17 02:29   


 


 


 Al Hydroxide/Mg


 Hydroxide


  (Maalox Susp)  30 ml  Q4H  PRN


 PO  12/27/16 02:30


 1/26/17 02:29   


 


 


 Bismuth


 Subsalicylate


  (Kaopectate Liqd)  15 ml  DAILY  PRN


 PO  12/27/16 02:30


 1/26/17 02:29   


 


 


 Magnesium


 Hydroxide


  (Milk Of


 Magnesia Susp)  30 ml  DAILY  PRN


 PO  12/27/16 02:30


 1/26/17 02:29   


 


 


 Sodium Chloride


  (Ocean Nasal


 Spray)    PRN  PRN


 NA  12/27/16 02:30


 1/26/17 02:29   


 


 


 Hydroxyzine HCl


  (Vistaril Tab)  50 mg  HSZ  PRN


 PO  12/27/16 02:30


 1/26/17 02:29  12/27/16 22:07


50 MG


 


 Hydroxyzine HCl


  (Vistaril Tab)  25 mg  Q4H  PRN


 PO  12/27/16 02:30


 1/26/17 02:29   


 


 


 Prazosin HCl


  (Prazosin)  1 mg  HS


 PO  12/27/16 22:00


 1/26/17 21:59  12/27/16 22:04


1 MG


 


 Ondansetron HCl


  (Zofran Tab)  4 mg  Q6H  PRN


 PO  12/27/16 02:30


 1/26/17 02:29  12/28/16 14:10


4 MG


 


 Baclofen


  (Lioresal Tab)  10 mg  TID


 PO  12/27/16 09:00


 1/26/17 08:59  12/28/16 14:08


10 MG


 


 Tramadol HCl


  (Ultram Tab)  50 mg  Q6H  PRN


 PO  12/27/16 02:30


 1/26/17 02:29  12/28/16 13:21


50 MG


 


 Haloperidol


 Lactate


  (Haldol Inj)  5 mg  Q4  PRN


 IM  12/27/16 09:30


 1/26/17 09:29   


 


 


 Haloperidol


  (Haldol Tab)  5 mg  Q4H  PRN


 PO  12/27/16 09:30


 1/26/17 09:29   


 


 


 Lorazepam


  (Ativan Inj)  1 mg  Q4H  PRN


 IM  12/27/16 09:30


 1/26/17 09:29   


 


 


 Lorazepam


  (Ativan Tab)  1 mg  Q4  PRN


 PO  12/27/16 09:30


 1/26/17 09:29   


 











Impression


41 year old female with psychiatric issues, chronic pain, migraine headaches





Plan


1. no further imaging needed- full work up as outpatient for headaches


2. adding additional medication (as she had tried most available) does not seem 

like a good option at this point- will start magnesium 400 mg daily and would 

try riboflavin but not on formulary in hospital


3. EEG - to r/o any seizure focus to headaches and facial phenomenon-pending 

read


4. pain mgt - trigger point injections done this am. Will not do botox due to 

needed follow up


5. possible referral to headache specialist as an out patient


6. no further treatment recommended from neurology stand point-will sign off 

for now 


7. scheduled with Dr Vargas as outpatient can keep current appointment-then can 

be referred to [facility] Briceño in [place] headache specialist for further 

intervention





I have seen and discussed above patient with Dr Mahesh Vargas, neurology





Patient dseen and examined and above reviewed with Parul GARCIA C  today 

was briefly better post injection but pain is now back and she is tearful and 

openly  suicidal  in terms of thoughts  Am very leery about steroids here but 

if psych wants to try them wiith close observation of behavior we could go for 

a short tapering course  Mag oxide and riboflavin not likley to help but will 

do no harm  She will need an outpatient specialty headache clinic at a tertiary 

center post discharge and in light of her [facility] coverage suspect this will have 

to be [place] we will see tomorrow but for now have few or no other 

suggestions   mahesh Vargas MD

## 2016-12-28 NOTE — CONSULTATION REPORT
DATE OF CONSULTATION:  12/28/2016

 

INPATIENT NEW CONSULT

 

CHIEF COMPLAINT:  Headache.

 

HISTORY OF PRESENT ILLNESS:  I saw a 41-year-old, MsLamont Reyez today at

the [facility].  She was admitted on 12/26/2016 with

intentional overdose, having taken 20 Ativan tablets.  She reports that this

was her first suicide attempt.  She states that she has chronic, since the

age of 13, headaches and jaw pain and has a history of multiple closed head

injuries and jaw dislocation on the left side.  She states that it feels like

there is "a spike" going through her left eye at this current time as well as

through the right-side back of her occiput.  She states that pain is chronic

and worse with moving around and stress and better with sleeping.  She states

that pain has gotten to the point where she just wants it to stop, prompting

her overdose as well as other stressful things in her life.  She states that

she has tried about 40 different types of medications to alleviate her pain

with no relief.  She did have 2 appointments scheduled at our office for

evaluation, but no showed for both of those office visits.

 

She denies any bowel or bladder incontinence, motor weakness, footdrop or

falls.  She admits to blurry vision in the past, but none current.  She

denies any seizure activity or hemiparesis associated with her headaches.

 

PAST MEDICAL HISTORY:  Significant for anxiety, agoraphobia, depression,

dysmetabolic syndrome X, fibromyalgia, chronic headache disorder,

hemiplegic migraine without status migrainosus, hypokalemia, mood disorder,

polycystic ovary syndrome and PTSD.

 

PAST SURGICAL HISTORY:  Significant for appendectomy and cholecystectomy.

 

FAMILY HISTORY:  Significant for personality disorder and diabetes.

 

SOCIAL HISTORY:  She denies tobacco or alcohol usage at this time.  She does

admit to marijuana usage.  She is single with a 15-year-old daughter.

 

MEDICATIONS AND ALLERGIES:  Reviewed as per EMR.

 

REVIEW OF SYSTEMS:  A 10-point review of systems is otherwise negative.

 

IMAGING STUDIES:  CT scan of her head dated 12/26/2016 shows no acute

intracranial findings.

 

PHYSICAL EXAMINATION:

VITAL SIGNS:  Height is 172 cm and her BMI is 33.  Blood pressure is 129/84,

pulse 74, respirations 16, and temperature 36.9 degrees centigrade.

GENERAL:  She appears older than her stated age of 41 years old, is awake,

alert and oriented x3, appearing in no acute distress, lying in bed.  She is

exquisitely tender over left supraorbital and supratrochlear nerves.  Mildly

tender over the right.  Tender over the left cranial nerve V3 mandibular

branch.  Tender over the left auriculotemporal branch.  Mildly tender over

the left greater occipital and lesser occipital nerves.  Nontender over the

right auriculotemporal nerve.  Exquisitely tender over the right greater and

lesser occipital nerves.  She has mild spasm located over splenius capitis

and bilateral mid and proximal trapezius, left equal to right.  She is not

tender over her cervical facets.  Spurling's maneuver is negative bilaterally.  
She has

adequate range of motion of her cervical spine in all planes.  She has 5/5

strength in the bilateral upper extremities, equal throughout with intact

sensation.  No appreciable ankle clonus.  Gait was not observed.

 

ASSESSMENT:

1.  Chronic headache disorder.

2.  History of chronic migraine without status migrainosus.

3.  Depression.

4.  Mood disorder.

5.  Posttraumatic stress disorder.

 

TREATMENT:

1.  We will plan for a left supraorbital, supratrochlear, and

auriculotemporal and cranial nerve V3 mandibular branch nerve block as well

as a right greater and lesser occipital nerve blocks.  Should she have good

relief of this, could be a possible candidate for RFA in the future.

2.  Would hold on Botox administration at this time due to need for 

maintenance at every 90-day intervals and suspect this will be

difficult for the patient to make these appointments.

3.  About 5 minutes after the nerve block was performed, the patient notes

start of improvement and reports that it feels as if "the spike" has been

removed from her head and her jaw pain is dramatically improved.

4.  Would recommend conservative tramadol use and would not escalate

narcotics further than tramadol.  Would diminish use if possible.

5.  Please call with any questions.

 

Thanks for the consult.

 

 

 

HA

## 2016-12-29 NOTE — NEUROLOGY PROGRESS NOTES
Neurology Progress Note


Date of Service


Dec 29, 2016.





Mell Hi is seen in the psychiatry unit for recurrent depression, PTSD, 

generalized anxiety and panic disorder as well as cannabis and opiate use 

disorders. She is diagnosed with personality disorder. She was seen in the ED 

due to an overdose attempt on  Ativan.  She had attempted to swallow about 20 

tablets and then her boyfriend grabbed the bottle and then she spit out the 

ones in her mouth. She feels the chronic headaches an pain are not tolerable 

and she can't live this way. She last saw Dr Vargas in Neurology in September 

and no new medications were started. She has had the most relief from the 

combination of Ultram 50 mg and Robomol 750 QID. She states last month the 

Robomol was changed to baclofen which she thinks does not help. Dr Vargas had 

set her up for pain mgt and EEG for further management but she didn't follow 

through with the visits stating she was in too much pain to go to the 

appointments. She states the steroids were tried in the past with little 

effect. It actually escalated the psychiatric issues. Currently she has a 

headache that is stabbing behind her eye and it wraps around to the front of 

her face and jaw. She states she fell 2 months ago and hit her head 3 times 

while falling down the stairs. 


Currently she is a group sitting and doing well. She appears very in gauged 

with what she is doing. She waves out the window and has a smile on her face. 


Nursing staff reports that she thinks the injections are helping and they 

offered her botox as an outpatient.





Objective











  Date Time  Temp Pulse Resp B/P Pulse Ox O2 Delivery O2 Flow Rate FiO2


 


12/29/16 06:30 36.9 69 16 96/53    





  76  90/58    








no new labs


Imaging:


This EEG is essentially normal during wakefulness without


evidence for a focal or generalized encephalopathy and without evidence for


potentially epileptogenic activity.


Exam:


gen: appears in no acute distress, pleasant, engaged in activity, smiles and 

waves 


sitting and moving with no difficulty or any signs of pain.





 Current Inpatient Medications








 Medications


  (Trade)  Dose


 Ordered  Sig/Evelia


 Route  Start Time


 Stop Time Status Last Admin


Dose Admin


 


 Acetaminophen


  (Tylenol Tab)  650 mg  Q4H  PRN


 PO  12/27/16 02:30


 1/26/17 02:29   


 


 


 Al Hydroxide/Mg


 Hydroxide


  (Maalox Susp)  30 ml  Q4H  PRN


 PO  12/27/16 02:30


 1/26/17 02:29   


 


 


 Bismuth


 Subsalicylate


  (Kaopectate Liqd)  15 ml  DAILY  PRN


 PO  12/27/16 02:30


 1/26/17 02:29   


 


 


 Magnesium


 Hydroxide


  (Milk Of


 Magnesia Susp)  30 ml  DAILY  PRN


 PO  12/27/16 02:30


 1/26/17 02:29   


 


 


 Sodium Chloride


  (Ocean Nasal


 Spray)    PRN  PRN


 NA  12/27/16 02:30


 1/26/17 02:29   


 


 


 Hydroxyzine HCl


  (Vistaril Tab)  50 mg  HSZ  PRN


 PO  12/27/16 02:30


 1/26/17 02:29  12/28/16 22:37


50 MG


 


 Hydroxyzine HCl


  (Vistaril Tab)  25 mg  Q4H  PRN


 PO  12/27/16 02:30


 1/26/17 02:29   


 


 


 Prazosin HCl


  (Prazosin)  1 mg  HS


 PO  12/27/16 22:00


 1/26/17 21:59  12/28/16 22:00


1 MG


 


 Ondansetron HCl


  (Zofran Tab)  4 mg  Q6H  PRN


 PO  12/27/16 02:30


 1/26/17 02:29  12/28/16 14:10


4 MG


 


 Baclofen


  (Lioresal Tab)  10 mg  TID


 PO  12/27/16 09:00


 1/26/17 08:59  12/29/16 14:20


10 MG


 


 Tramadol HCl


  (Ultram Tab)  50 mg  Q6H  PRN


 PO  12/27/16 02:30


 1/26/17 02:29  12/29/16 08:56


50 MG


 


 Haloperidol


 Lactate


  (Haldol Inj)  5 mg  Q4  PRN


 IM  12/27/16 09:30


 1/26/17 09:29   


 


 


 Haloperidol


  (Haldol Tab)  5 mg  Q4H  PRN


 PO  12/27/16 09:30


 1/26/17 09:29   


 


 


 Lorazepam


  (Ativan Inj)  1 mg  Q4H  PRN


 IM  12/27/16 09:30


 1/26/17 09:29   


 


 


 Lorazepam


  (Ativan Tab)  1 mg  Q4  PRN


 PO  12/27/16 09:30


 1/26/17 09:29   


 


 


 Magnesium Oxide


  (Mag-Ox Tab)  400 mg  QAM


 PO  12/29/16 09:00


 1/28/17 08:59  12/29/16 08:56


400 MG











Impression


41 year old female with psychiatric issues, chronic pain, migraine headaches





Plan


1. no further imaging needed- full work up as outpatient for headaches


2. adding additional medication (as she had tried most available) does not seem 

like a good option at this point- will start magnesium 400 mg daily and would 

try riboflavin but not on formulary in hospital


3. EEG - to r/o any seizure focus to headaches and facial phenomenon- had no 

recorded seizure activity-WNL


4. pain mgt - trigger point injections done - botox may be option as outpatient


5. possible referral to headache specialist as an out patient


6. no further treatment recommended from neurology stand point-will sign off 

for now 


7. scheduled with Dr Vargas as outpatient can keep current appointment-then can 

be referred to Verenice Briceño in [place] headache specialist for further 

intervention





I have seen and discussed above patient with Dr Israel Vargas, neurology





Significantly improved denies suicidality now haeadache down to a 6/10 which 

for her is tolerable and she looks well  Pain minagement has seen her and she 

my wel be a botox candidate  Neurology will sign off at this point We can see 

her as neede but if botox fails to afford relief she is going to need to move 

on to a tertiary level headache clinic  Israel Vargas MD

## 2016-12-29 NOTE — PSYCHIATRIC PROGRESS NOTES
Progress Note


Date of Service


Dec 29, 2016.





Interval History


42 yo female admitted voluntarily on 12/27/16 with severe depression and 

suicidality, having attempted to OD on ativan but was stopped by her boyfriend.

  She has  along history of mental health treatment for PTSD, depression and 

borderline personality disorder.





Chief Complaint


"My pain is back and I'm in hell.".





Subjective


Patient was seen & assessed interval progress reviewed with Treatment Team.  

The patient is very dramatic when presenting to the interview room, saying that 

her equilibrium is off and stumbling into the door frame.  She begins the 

session again by focusing on her pain, which she feels is unrelieved by the 

injections yesterday and now wants to proceed with the recommended Botox 

injections.  We reviewed her family meeting yesterday with Dasha.  She 

acknowledges that she was very angry during the meeting and said things that 

she did not mean that she is sure were hurtful.  She reports a history of 

"stuffing my anger and not talking" and then behaving badly in response.  She 

admits that she says she's going to kill herself as an expression of her 

desperation, but does not want to die.  She feels that Arlette, Dasha's wife, is 

angry with her all of the time and doesn't want her there, but with exploration 

is able to acknowledge that perhaps she is projecting her own anger onto 

others.  She is trying to differentiate what is hers to own ("most of it") and 

how to deal with it.  She repeats that pain has interfered with her ability to 

deal with her life, at times confusing her thoughts, and making life 

unbearable.  today she says "I don't want to kill myself.".  Nursing reports 

that she made multiple statements about suicide during her meeting and after.





Review of Systems


Constitutional:  + fatigue


ENT:  No dental problems, No hearing loss, No nasal symptoms, No problem 

reported, No sore throat, No tinnitus, No trouble swallowing, No unusual 

epistaxis


Respiratory:  No cough, No dyspnea at rest, No dyspnea on exertion, No 

hemoptysis, No problem reported, No shortness of breath, No sputum, No wheezing


Cardiovascular:  No PND, No chest pain, No claudication, No edema, No orthopnea

, No palpitations, No problem reported


Abdomen:  No GI bleeding, No constipation, No diarrhea, No nausea, No pain, No 

problem reported, No vomiting


Musculoskeletal:  + problem reported (pain in multiple joints)


Neurologic:  No balance problems, No memory loss, No numbness/tingling, No 

paralysis, No problem reported, No vertigo, No weakness


Psychiatric:  + depression symptoms


Integumentary:  No bleeding, No color change, No itch, No new/changing skin 

lesions, No problem reported, No rash





Sleep Information


Total Hours of Sleep:  3.25





Meal Information


Percent of Breakfast Consumed:  100


Percent of Lunch Consumed:  100


Percent of Dinner Consumed:  90





Mental Status Exam


During interview pt is:  alert and oriented


Appearance:  appropriately groomed, disheveled (still in hospital gowns)


Eye contact is:  good


Motor behavior is:  steady gait & station


Speech:  loud (and at times rapid)


Affect:  labile, irritable


Mood is:  depressed, irritable


Thought process:  perseveration (regarding pain issues)


Thought content:  reality based without delusions


Suicidal thought are:  present (but conditional)


Homicidal thoughts are:  denied


Hallucinations:  denies auditory, denies visual


Cognition:  memory grossly intact, attention grossly intact


Intelligence estimated to be:  average


Insight:  impaired


Judgement:  impaired





Medication Trials





(1) past psych meds


Prozac, paxil, lithium, depakote, risperdal, zyprexa, lamictal, sertraline, 

amitriptyline, cymbalta  Last Edited By: Zhanna Fuchs on Dec 28, 2016 10:46





Impression


Difficult meeting with her boyfriend Dasha yesterday, finding it difficult to 

hear him say that her behavior needs to change.  She responded catastrophically 

with threats to kill herself, but has pulled back from that saying that she 

doesn't want to die.  We discussed the need to learn a new emotional vocabulary 

rather than jumping to suicidality to express her desperation and she seems 

better able to hear that today.  She continues to refuse meds.  Will work to 

set up OP treatment which she says that she attends but Dasha says she sabotages.

  She will need to demonstrate multiple days in which she can be in control and 

without suicidal statements prior to discharge.   She may also benefit from a 

meeting with dasha's wife Arlette with whom she is currently in conflict, if she is 

to return to their home.





Continued Inpatient Care


The patient continues to require in patient care due to the severity of her 

condition, and the risk for self harm if discharged.





Plan





(1) Major depressive disorder, recurrent severe without psychotic features


12/28


   - Q 15 min checks for safety


   - Encourage participation in group and individual counseling


   - Obtain OP records from [place] and from her therapist


   - Recommend family meeting with those with whom she lives. 


   - Patient currently refusing a trial of ADM's


   - Coordinate aftercare with current providers


   - Assist the patient to learn and utilize healthy coping strategies


   - Safety plan


12/29


   - Meeting with boyfriend Dasha yesterday


   - Assist the patient to improve her emotional vocabulary and willing to 

express her emotions assertively





(2) Chronic post-traumatic stress disorder (PTSD)


12/28/16


   - Encourage healthy coping strategies


   - Individual and group therapies





(3) SIERRA (generalized anxiety disorder)


12/28


   - Ativan DC'd s/p OD and will need to coordinate use with OP provider


   -  Assist the patient to learn and utilize healthy coping strategies


   - Vistaril prn for anxiety or sleep


   - Encourage mindfulness, deep breathing, relaxation and exercise





(4) Panic disorder with agoraphobia


12/28


   - see interventions for SIERRA





(5) Borderline personality disorder


12/28


   - Be consistent in all boundaries


   - Assist the patient to have good boundaries with peers


   - Encourage healthy coping strategies, and discourage dysfunctional ones


   - Coordinate with OP therapist





(6) Cannabis abuse


12/28


   - Recommend abstinence


   - Educate about the negative impact of substances to mood








Discharge / Aftercare Planning


Primary Care Physician:  


   Name:  Dr Carr


   Phone Number:  [phone]


Psychiatrist:  


   Name:  Leigha Dalton


Therapist:  


   Name:  Tati Lai


:  


   Name:  .none





Visit Code


E&M Code:  29596





Risk Factors Assessment


:  Yes


/single/:  Yes


Higher / Fall in social status:  No


Access to guns:  No


Health problems:  Yes


Mental Health Diagnoses:  Yes


Substance use disorders:  Yes


Previous attempt:  Yes


Previous psychiatric stay:  Yes


Hopelessness:  Yes


Smoker:  No





Protective Factors Assessment


Rastafari beliefs:  No


:  No


Responsible for young children:  Yes


Employed:  No


Stable relationships:  No


Supportive family:  No


Good rapport with provider:  Yes





Data


Vital Signs Last 24 Hrs:











  Date Time  Temp Pulse Resp B/P Pulse Ox O2 Delivery O2 Flow Rate FiO2


 


12/29/16 06:30 36.9 69 16 96/53    





  76  90/58    








Meds Administered Last 24 Hrs:





Meds Administered (Past 24Hrs)








 Medications


  (Trade)  Dose


 Ordered  Sig/Evelia


 Route  Start Time


 Stop Time Status Last Admin


Dose Admin


 


 Prazosin HCl


  (Prazosin)  1 mg  HS


 PO  12/27/16 22:00


 1/26/17 21:59  12/28/16 22:00


1 MG


 


 Magnesium Oxide


  (Mag-Ox Tab)  400 mg  QAM


 PO  12/29/16 09:00


 1/28/17 08:59  12/29/16 08:56


400 MG








Lab Results Last 24 Hrs:


12/26/16 17:50








Red Blood Count 4.67, Mean Corpuscular Volume 89.1, Mean Corpuscular Hemoglobin 

30.2, Mean Corpuscular Hemoglobin Concent 33.9, Mean Platelet Volume 9.6, 

Neutrophils (%) (Auto) 86.8, Lymphocytes (%) (Auto) 8.4, Monocytes (%) (Auto) 

4.2, Eosinophils (%) (Auto) 0.2, Basophils (%) (Auto) 0.2, Neutrophils # (Auto) 

11.22, Lymphocytes # (Auto) 1.09, Monocytes # (Auto) 0.54, Eosinophils # (Auto) 

0.02, Basophils # (Auto) 0.03





12/26/16 17:50

















Test


  12/26/16


17:50 12/26/16


17:52 12/26/16


17:55 12/26/16


17:57


 


White Blood Count


  12.92 K/uL


(4.8-10.8) 


  


  


 


 


Red Blood Count


  4.67 M/uL


(4.2-5.4) 


  


  


 


 


Hemoglobin


  14.1 g/dL


(12.0-16.0) 


  


  


 


 


Hematocrit 41.6 % (37-47)    


 


Mean Corpuscular Volume


  89.1 fL


() 


  


  


 


 


Mean Corpuscular Hemoglobin


  30.2 pg


(25-34) 


  


  


 


 


Mean Corpuscular Hemoglobin


Concent 33.9 g/dl


(32-36) 


  


  


 


 


Platelet Count


  307 K/uL


(130-400) 


  


  


 


 


Mean Platelet Volume


  9.6 fL


(7.4-10.4) 


  


  


 


 


Neutrophils (%) (Auto) 86.8 %    


 


Lymphocytes (%) (Auto) 8.4 %    


 


Monocytes (%) (Auto) 4.2 %    


 


Eosinophils (%) (Auto) 0.2 %    


 


Basophils (%) (Auto) 0.2 %    


 


Neutrophils # (Auto)


  11.22 K/uL


(1.4-6.5) 


  


  


 


 


Lymphocytes # (Auto)


  1.09 K/uL


(1.2-3.4) 


  


  


 


 


Monocytes # (Auto)


  0.54 K/uL


(0.11-0.59) 


  


  


 


 


Eosinophils # (Auto)


  0.02 K/uL


(0-0.5) 


  


  


 


 


Basophils # (Auto)


  0.03 K/uL


(0-0.2) 


  


  


 


 


RDW Standard Deviation


  43.6 fL


(36.4-46.3) 


  


  


 


 


RDW Coefficient of Variation


  13.5 %


(11.5-14.5) 


  


  


 


 


Immature Granulocyte % (Auto) 0.2 %    


 


Immature Granulocyte # (Auto)


  0.02 K/uL


(0.00-0.02) 


  


  


 


 


Anion Gap


  11.0 mmol/L


(3-11) 


  


  


 


 


Est Creatinine Clear Calc


Drug Dose 102.2 ml/min 


  


  


  


 


 


Estimated GFR (


American) 93.3 


  


  


  


 


 


Estimated GFR (Non-


American 80.5 


  


  


  


 


 


BUN/Creatinine Ratio 13.8 (10-20)    


 


Osmolality


  290 mOsm/kg


(280-300) 


  


  


 


 


Calcium Level


  8.5 mg/dl


(8.5-10.1) 


  


  


 


 


Total Bilirubin


  0.7 mg/dl


(0.2-1) 


  


  


 


 


Direct Bilirubin


  0.1 mg/dl


(0-0.2) 


  


  


 


 


Aspartate Amino Transf


(AST/SGOT) 12 U/L (15-37) 


  


  


  


 


 


Alanine Aminotransferase


(ALT/SGPT) 14 U/L (12-78) 


  


  


  


 


 


Alkaline Phosphatase


  63 U/L


() 


  


  


 


 


Total Creatine Kinase


  34 U/L


() 


  


  


 


 


Creatine Kinase MB


  < 0.5 ng/ml


(0.5-3.6) 


  


  


 


 


Creatine Kinase MB Ratio  (0-3.0)    


 


Troponin I


  < 0.015 ng/ml


(0-0.045) 


  


  


 


 


Total Protein


  7.9 gm/dl


(6.4-8.2) 


  


  


 


 


Albumin


  4.1 gm/dl


(3.4-5.0) 


  


  


 


 


Lipase


  124 U/L


() 


  


  


 


 


Human Chorionic Gonadotropin,


Qual NEG (NEG) 


  


  


  


 


 


Salicylates Level


  < 1.7 mg/dl


(2.8-20) 


  


  


 


 


Acetaminophen Level


  < 2 ug/ml


(10-30) 


  


  


 


 


Ethyl Alcohol mg/dL


  < 3.0 mg/dl


(0-3) 


  


  


 


 


Prothrombin Time


  


  10.4 SECONDS


(9.0-12.0) 


  


 


 


Prothromb Time International


Ratio 


  1.0 (0.9-1.1) 


  


  


 


 


Activated Partial


Thromboplast Time 


  25.8 SECONDS


(21.0-31.0) 


  


 


 


Partial Thromboplastin Ratio  1.0   


 


Urine Color   YELLOW  


 


Urine Appearance   CLEAR (CLEAR)  


 


Urine pH   6.0 (4.5-7.5)  


 


Urine Specific Gravity


  


  


  1.011


(1.000-1.030) 


 


 


Urine Protein   NEG (NEG)  


 


Urine Glucose (UA)   NEG (NEG)  


 


Urine Ketones   NEG (NEG)  


 


Urine Occult Blood   NEG (NEG)  


 


Urine Nitrite   NEG (NEG)  


 


Urine Bilirubin   NEG (NEG)  


 


Urine Urobilinogen   NEG (NEG)  


 


Urine Leukocyte Esterase   NEG (NEG)  


 


Urine Opiates Screen   NEG (NEG)  


 


Urine Methadone, Qualitative   NEG (NEG)  


 


Urine Barbiturates   NEG (NEG)  


 


Urine Phencyclidine (PCP)


Level 


  


  NEG (NEG) 


  


 


 


Ur


Amphetamine/Methamphetamine 


  


  NEG (NEG) 


  


 


 


MDMA (Ecstasy) Screen   NEG (NEG)  


 


Urine Benzodiazepines Screen   NEG (NEG)  


 


Urine Cocaine Metabolite   NEG (NEG)  


 


Urine Marijuana (THC)   POS (NEG)

## 2016-12-30 NOTE — PSYCH MANAGEMENT PROGRESS NOTE
Psychiatry Miscellaneous


Date of Service:  Dec 30, 2016.


I personally participated in the case review and medical recommendations 

outlined in the progress note documented by LORNA Joshua. Met with 

patient to review care/concerns.  Remains pain focussed but participating in 

unit activities today.

## 2016-12-30 NOTE — MEDICAL INTERVAL PROGRESS NOTE
DATE: 12/29/2016

 

Room number south 325, bed 1.

 

The patient was seen this morning for followup after undergoing peripheral

cranial nerve blocks.  This morning she reports that she had relief for

approximately 4 hours of her typical pain over the right jaw as well as her

headache.  She reports her symptoms recurred since then.  She denies any

other problems or other persistent symptoms.

 

PHYSICAL EXAMINATION:  Exam demonstrates injection site to be unremarkable. 

She has diffuse tenderness over the calvarium.  She has pain over the left

mandibular region.

 

ASSESSMENT:  Tension headaches responsive to peripheral cranial nerve

blockade.

 

TREATMENT AND RECOMMENDATIONS:  Recommended either Botox injections or RFA. 

The patient was instructed on the risks, benefits as well as alternatives

with reference to longer term relief of headaches using either Botox or

radiofrequency ablation.  Her questions were answered.  She requires more

time to review her options and decide.  If she does elect to proceed with RFA

or Botox, she can contact the pain management center at [facility] after

discharge.

## 2016-12-30 NOTE — PSYCHIATRIC PROGRESS NOTES
Progress Note


Date of Service


Dec 30, 2016.





Interval History


42 yo female admitted voluntarily on 12/27/16 with severe depression and 

suicidality, having attempted to OD on ativan but was stopped by her boyfriend.

  She has  along history of mental health treatment for PTSD, depression and 

borderline personality disorder.





Chief Complaint


"I'm in hell again.".





Subjective


Patient was seen & assessed interval progress reviewed with Treatment Team.  

The patient says that she had a relatively good evening yesterday, feeling that 

her pain was somewhat controlled (6-7/10) and even had a good conversation with 

her boyfriend Jackson.  She said that she apologized to him and described herself 

as "a spaz" and that she didn't really want to kill herself.  They talked about 

some fun things happening at home with the kitten and their dogs which amused 

her.  She continues to feel frustrated with her pain, that she perceives is 

altering her personality, describing it as "exhausting".  She denies suicidal 

thoughts, but says that her pain was worse upon awakening today, which made her 

feel like "How am I going to get through this?".   She has decided that she 

wants to proceed with Botox injections for her headaches, which will likely 

occur as an OP with pain management.  Nursing reports that she has been 

attending groups with good participation





Review of Systems


Constitutional:  + fatigue (related to chronic pain)


ENT:  No dental problems, No hearing loss, No nasal symptoms, No problem 

reported, No sore throat, No tinnitus, No trouble swallowing, No unusual 

epistaxis


Respiratory:  No cough, No dyspnea at rest, No dyspnea on exertion, No 

hemoptysis, No problem reported, No shortness of breath, No sputum, No wheezing


Cardiovascular:  No PND, No chest pain, No claudication, No edema, No orthopnea

, No palpitations, No problem reported


Abdomen:  No GI bleeding, No constipation, No diarrhea, No nausea, No pain, No 

problem reported, No vomiting


Musculoskeletal:  No calf pain, No joint pain, No muscle pain, No problem 

reported, No swelling


Neurologic:  + problem reported (chronic HA)


Psychiatric:  + depression symptoms


Integumentary:  No bleeding, No color change, No itch, No new/changing skin 

lesions, No problem reported, No rash





Sleep Information


Total Hours of Sleep:  5.75





Meal Information


Percent of Breakfast Consumed:  25


Percent of Lunch Consumed:  100


Percent of Dinner Consumed:  0





Mental Status Exam


During interview pt is:  alert and oriented


Appearance:  appropriately groomed, disheveled (still in hospital gowns)


Eye contact is:  good


Motor behavior is:  steady gait & station


Speech:  loud (and at times rapid)


Affect:  labile, irritable


Mood is:  depressed, irritable


Thought process:  perseveration (regarding pain issues)


Thought content:  reality based without delusions


Suicidal thought are:  present (but conditional)


Homicidal thoughts are:  denied


Hallucinations:  denies auditory, denies visual


Cognition:  memory grossly intact, attention grossly intact


Intelligence estimated to be:  average


Insight:  impaired


Judgement:  impaired





Medication Trials





(1) past psych meds


Prozac, paxil, lithium, depakote, risperdal, zyprexa, lamictal, sertraline, 

amitriptyline, cymbalta  Last Edited By: Zhanna Fuchs on Dec 28, 2016 10:46





Impression


In better control today, and is no longer suicidal.  Pain remains her focus and 

has now decided to proceed with the recommended botox injections.  She 

continues to refuse medications, saying that she has had many trials in the 

past without benefit, but is participation in group and individual counseling 

with some success.  Her coping strategies and interpersonal relationships 

remain problematic, and will need to have long term therapy for her BPD.  If 

she can sustain several days in which she is in good emotional and behavioral 

control, could consider discharge.





Continued Inpatient Care


The patient continues to require in patient care due to the severity of her 

condition, and the risk for self harm if discharged.





Plan





(1) Major depressive disorder, recurrent severe without psychotic features


12/28


   - Q 15 min checks for safety


   - Encourage participation in group and individual counseling


   - Obtain OP records from [facility] and from her therapist


   - Recommend family meeting with those with whom she lives. 


   - Patient currently refusing a trial of ADM's


   - Coordinate aftercare with current providers


   - Assist the patient to learn and utilize healthy coping strategies


   - Safety plan


12/29


   - Meeting with boyfriend Jackson yesterday


   - Assist the patient to improve her emotional vocabulary and willing to 

express her emotions assertively





(2) Chronic post-traumatic stress disorder (PTSD)


12/28/16


   - Encourage healthy coping strategies


   - Individual and group therapies





(3) SIERRA (generalized anxiety disorder)


12/28


   - Ativan DC'd s/p OD and will need to coordinate use with OP provider


   -  Assist the patient to learn and utilize healthy coping strategies


   - Vistaril prn for anxiety or sleep


   - Encourage mindfulness, deep breathing, relaxation and exercise





(4) Panic disorder with agoraphobia


12/28


   - see interventions for SIERRA





(5) Borderline personality disorder


12/28


   - Be consistent in all boundaries


   - Assist the patient to have good boundaries with peers


   - Encourage healthy coping strategies, and discourage dysfunctional ones


   - Coordinate with OP therapist





(6) Cannabis abuse


12/28


   - Recommend abstinence


   - Educate about the negative impact of substances to mood








Discharge / Aftercare Planning


Primary Care Physician:  


   Name:  Dr Carr


   Phone Number:  [phone]


   Date of Appointment:  Mar 7, 2017


   Time of Appointment:  6:50pm


Psychiatrist:  


   Name:  Neeru Dalton


   Phone Number:  [phone]


   Date of Appointment:  Jan 9, 2017


   Time of Appointment:  1:00pm


   Appointment Notes:  Hospital follow up visit will be 30 minutes


Therapist:  


   Name:  Tati Lai


   Phone Number:  [phone]


:  


   Name:  .none


Neurologist:  


   Name:  Dr. Vargas


   Phone Number:  [phone]


   Appointment Notes:  Waiting for phone call back for appointment


Specialist:  


   Name:  [facility]


   Phone Number:  [phone]


   Date of Appointment:  Jan 17, 2017


   Time of Appointment:  2:20 pm





Visit Code


E&M Code:  86351





Risk Factors Assessment


:  Yes


/single/:  Yes


Higher / Fall in social status:  No


Access to guns:  No


Health problems:  Yes


Mental Health Diagnoses:  Yes


Substance use disorders:  Yes


Previous attempt:  Yes


Previous psychiatric stay:  Yes


Hopelessness:  Yes


Smoker:  No





Protective Factors Assessment


Mandaeism beliefs:  No


:  No


Responsible for young children:  Yes


Employed:  No


Stable relationships:  No


Supportive family:  No


Good rapport with provider:  Yes





Data


Vital Signs Last 24 Hrs:











  Date Time  Temp Pulse Resp B/P Pulse Ox O2 Delivery O2 Flow Rate FiO2


 


12/30/16 06:41 36.8 77 16 120/71    








Meds Administered Last 24 Hrs:





Meds Administered (Past 24Hrs)








 Medications


  (Trade)  Dose


 Ordered  Sig/Evelia


 Route  Start Time


 Stop Time Status Last Admin


Dose Admin


 


 Magnesium Oxide


  (Mag-Ox Tab)  400 mg  QAM


 PO  12/29/16 09:00


 1/28/17 08:59  12/30/16 09:02


400 MG








Lab Results Last 24 Hrs:


12/26/16 17:50








Red Blood Count 4.67, Mean Corpuscular Volume 89.1, Mean Corpuscular Hemoglobin 

30.2, Mean Corpuscular Hemoglobin Concent 33.9, Mean Platelet Volume 9.6, 

Neutrophils (%) (Auto) 86.8, Lymphocytes (%) (Auto) 8.4, Monocytes (%) (Auto) 

4.2, Eosinophils (%) (Auto) 0.2, Basophils (%) (Auto) 0.2, Neutrophils # (Auto) 

11.22, Lymphocytes # (Auto) 1.09, Monocytes # (Auto) 0.54, Eosinophils # (Auto) 

0.02, Basophils # (Auto) 0.03





12/26/16 17:50

















Test


  12/26/16


17:50 12/26/16


17:52 12/26/16


17:55 12/26/16


17:57


 


White Blood Count


  12.92 K/uL


(4.8-10.8) 


  


  


 


 


Red Blood Count


  4.67 M/uL


(4.2-5.4) 


  


  


 


 


Hemoglobin


  14.1 g/dL


(12.0-16.0) 


  


  


 


 


Hematocrit 41.6 % (37-47)    


 


Mean Corpuscular Volume


  89.1 fL


() 


  


  


 


 


Mean Corpuscular Hemoglobin


  30.2 pg


(25-34) 


  


  


 


 


Mean Corpuscular Hemoglobin


Concent 33.9 g/dl


(32-36) 


  


  


 


 


Platelet Count


  307 K/uL


(130-400) 


  


  


 


 


Mean Platelet Volume


  9.6 fL


(7.4-10.4) 


  


  


 


 


Neutrophils (%) (Auto) 86.8 %    


 


Lymphocytes (%) (Auto) 8.4 %    


 


Monocytes (%) (Auto) 4.2 %    


 


Eosinophils (%) (Auto) 0.2 %    


 


Basophils (%) (Auto) 0.2 %    


 


Neutrophils # (Auto)


  11.22 K/uL


(1.4-6.5) 


  


  


 


 


Lymphocytes # (Auto)


  1.09 K/uL


(1.2-3.4) 


  


  


 


 


Monocytes # (Auto)


  0.54 K/uL


(0.11-0.59) 


  


  


 


 


Eosinophils # (Auto)


  0.02 K/uL


(0-0.5) 


  


  


 


 


Basophils # (Auto)


  0.03 K/uL


(0-0.2) 


  


  


 


 


RDW Standard Deviation


  43.6 fL


(36.4-46.3) 


  


  


 


 


RDW Coefficient of Variation


  13.5 %


(11.5-14.5) 


  


  


 


 


Immature Granulocyte % (Auto) 0.2 %    


 


Immature Granulocyte # (Auto)


  0.02 K/uL


(0.00-0.02) 


  


  


 


 


Anion Gap


  11.0 mmol/L


(3-11) 


  


  


 


 


Est Creatinine Clear Calc


Drug Dose 102.2 ml/min 


  


  


  


 


 


Estimated GFR (


American) 93.3 


  


  


  


 


 


Estimated GFR (Non-


American 80.5 


  


  


  


 


 


BUN/Creatinine Ratio 13.8 (10-20)    


 


Osmolality


  290 mOsm/kg


(280-300) 


  


  


 


 


Calcium Level


  8.5 mg/dl


(8.5-10.1) 


  


  


 


 


Total Bilirubin


  0.7 mg/dl


(0.2-1) 


  


  


 


 


Direct Bilirubin


  0.1 mg/dl


(0-0.2) 


  


  


 


 


Aspartate Amino Transf


(AST/SGOT) 12 U/L (15-37) 


  


  


  


 


 


Alanine Aminotransferase


(ALT/SGPT) 14 U/L (12-78) 


  


  


  


 


 


Alkaline Phosphatase


  63 U/L


() 


  


  


 


 


Total Creatine Kinase


  34 U/L


() 


  


  


 


 


Creatine Kinase MB


  < 0.5 ng/ml


(0.5-3.6) 


  


  


 


 


Creatine Kinase MB Ratio  (0-3.0)    


 


Troponin I


  < 0.015 ng/ml


(0-0.045) 


  


  


 


 


Total Protein


  7.9 gm/dl


(6.4-8.2) 


  


  


 


 


Albumin


  4.1 gm/dl


(3.4-5.0) 


  


  


 


 


Lipase


  124 U/L


() 


  


  


 


 


Human Chorionic Gonadotropin,


Qual NEG (NEG) 


  


  


  


 


 


Salicylates Level


  < 1.7 mg/dl


(2.8-20) 


  


  


 


 


Acetaminophen Level


  < 2 ug/ml


(10-30) 


  


  


 


 


Ethyl Alcohol mg/dL


  < 3.0 mg/dl


(0-3) 


  


  


 


 


Prothrombin Time


  


  10.4 SECONDS


(9.0-12.0) 


  


 


 


Prothromb Time International


Ratio 


  1.0 (0.9-1.1) 


  


  


 


 


Activated Partial


Thromboplast Time 


  25.8 SECONDS


(21.0-31.0) 


  


 


 


Partial Thromboplastin Ratio  1.0   


 


Urine Color   YELLOW  


 


Urine Appearance   CLEAR (CLEAR)  


 


Urine pH   6.0 (4.5-7.5)  


 


Urine Specific Gravity


  


  


  1.011


(1.000-1.030) 


 


 


Urine Protein   NEG (NEG)  


 


Urine Glucose (UA)   NEG (NEG)  


 


Urine Ketones   NEG (NEG)  


 


Urine Occult Blood   NEG (NEG)  


 


Urine Nitrite   NEG (NEG)  


 


Urine Bilirubin   NEG (NEG)  


 


Urine Urobilinogen   NEG (NEG)  


 


Urine Leukocyte Esterase   NEG (NEG)  


 


Urine Opiates Screen   NEG (NEG)  


 


Urine Methadone, Qualitative   NEG (NEG)  


 


Urine Barbiturates   NEG (NEG)  


 


Urine Phencyclidine (PCP)


Level 


  


  NEG (NEG) 


  


 


 


Ur


Amphetamine/Methamphetamine 


  


  NEG (NEG) 


  


 


 


MDMA (Ecstasy) Screen   NEG (NEG)  


 


Urine Benzodiazepines Screen   NEG (NEG)  


 


Urine Cocaine Metabolite   NEG (NEG)  


 


Urine Marijuana (THC)   POS (NEG)  


 


Urine Marijuana (THC Carboxy


Acid) 


  


  367 NG/ML


(CUTOFF=5)

## 2016-12-31 NOTE — PSYCHIATRIC PROGRESS NOTES
Progress Note


Date of Service


Dec 31, 2016.





Interval History


40 yo female admitted voluntarily on 12/27/16 with severe depression and 

suicidality, having attempted to OD on ativan but was stopped by her boyfriend.

  She has  along history of mental health treatment for PTSD, depression and 

borderline personality disorder.





Chief Complaint


"I am in pain".





Subjective


Patient was seen & assessed interval progress reviewed with Nursing staff and 

chart reviewed  





Per nursing the patient had visit with boyfriend on 12/30/16 but instead of 

discussing her relationship issues and CYS concerns regarding 16yo having 

witnessed suicide attempt they predominantly discussed her pending botox 

treatments for her pain.


She continues to decline medication for her mood/anxiety, emotional reactivity.





Patient seen at the bedside she states she is in pain for her HA and it runs 

down her neck into her back which limits her.  She shares that nothing has 

worked for her.  SHe is hopeful for the botox intervention.  


She declines any further trials of Topamax (states it gave her tremors) or B6 

stating it did not help.  She states magnesium baths help her absorb magnesium 

into her muscles and help her relax.    SHe states that the magensium helps 

because it is absorbed in the bath through her skin and wishes she could do 

that here. When provider affirms behavioral associations with relaxation and 

skepticism about skin absorption of magnesium for her muscles she states she 

has read Evidence based peer reviewed articles on this. 


She back on oral magnesium which she states helps.  Encouraged her that 

behavioral work can be very helpful to train the body in coping and relaxing 

through chronic pain.


She states she has constant anxiety and catastrophic thinking.  She states that 

she is aware that she has catastrophic thinking.  She declines medications but 

states she is willing to trial ABC sheets to help her with her anxiety and pain 

related thinking.


She states she is suicidal when she is in severe pain states she does not have 

intention or plan "no I feel safe here"  but cannot state how she would cope if 

she again was in severe pain as she was the night of her OD and attempting to 

take the ativan as TI.





Review of Systems


She has head pain as above, denies other constitutional symptoms, and is not 

taking any psychotropic medications, denies SE to her medical medications.





Sleep Information


Total Hours of Sleep:  5.50





Meal Information


Percent of Breakfast Consumed:  25


Percent of Lunch Consumed:  100


Percent of Dinner Consumed:  100





Mental Status Exam


During interview pt is:  alert and oriented


Appearance:  appropriately groomed, disheveled (in bed with norma duran pj 

bottoms, t-shirt and star wars pillow case, hair in disarray)


Eye contact is:  good


Motor behavior is:  steady gait & station


Speech:  loud, other (initially slow and pained then becomes r/r/r/v soft tone, 

when reminded of her HA she again becomes slow and pained)


Affect:  blunted


Mood is:  depressed


Thought process:  perseveration (regarding pain issues seems to ignore her 

social stressors)


Thought content:  reality based without delusions


Suicidal thought are:  present (but conditional on pain)


Homicidal thoughts are:  denied


Hallucinations:  denies auditory, denies visual


Cognition:  memory grossly intact, attention grossly intact


Intelligence estimated to be:  average


Insight:  impaired


Judgement:  impaired





Medication Trials





(1) past psych meds


Prozac, paxil, lithium, depakote, risperdal, zyprexa, lamictal, sertraline, 

amitriptyline, cymbalta  Last Edited By: Zhanna Fuchs on Dec 28, 2016 10:46





Impression


In better control today, and is no longer suicidal.  Pain remains her focus and 

has now decided to proceed with the recommended botox injections.  She 

continues to refuse medications, saying that she has had many trials in the 

past without benefit, but is participation in group and individual counseling 

with some success.  Her coping strategies and interpersonal relationships 

remain problematic, and will need to have long term therapy for her BPD.  If 

she can sustain several days in which she is in good emotional and behavioral 

control, could consider discharge.





Continued Inpatient Care


The patient continues to require in patient care due to the severity of her 

condition, and the risk for self harm if discharged.





Plan





(1) Major depressive disorder, recurrent severe without psychotic features


12/28


   - Q 15 min checks for safety


   - Encourage participation in group and individual counseling


   - Obtain OP records from [place] and from her therapist


   - Recommend family meeting with those with whom she lives. 


   - Patient currently refusing a trial of ADM's


   - Coordinate aftercare with current providers


   - Assist the patient to learn and utilize healthy coping strategies


   - Safety plan


12/29


   - Meeting with boyfriend Jakcson yesterday


   - Assist the patient to improve her emotional vocabulary and willing to 

express her emotions assertively


12/31/16 patient continues to report depressed mood and SI based on her pain


             she continues to decline medications for depression and anxiety, 

but is open to CBT, will attempt to engage her in routine use of ABC worksheets





(2) Chronic post-traumatic stress disorder (PTSD)


12/28/16


   - Encourage healthy coping strategies


   - Individual and group therapies


12/31/16 continue as above, again work on CBT with ABC sheets for mood, perhaps 

at times PTSD topics will be touched on at patient's leading





(3) SIERRA (generalized anxiety disorder)


12/28 and continuing


   - Ativan DC'd s/p OD and will need to coordinate use with OP provider


   -  Assist the patient to learn and utilize healthy coping strategies


   - Vistaril prn for anxiety or sleep


   - Encourage mindfulness, deep breathing, relaxation and exercise





(4) Panic disorder with agoraphobia


12/28 and continuing


   - see interventions for SIERRA





(5) Borderline personality disorder


12/28 and continuing


   - Be consistent in all boundaries


   - Assist the patient to have good boundaries with peers


   - Encourage healthy coping strategies, and discourage dysfunctional ones


   - Coordinate with OP therapist





(6) Cannabis abuse


12/28 and continuing


   - Recommend abstinence


   - Educate about the negative impact of substances to mood








Discharge / Aftercare Planning


Primary Care Physician:  


   Name:  Dr Carr


   Phone Number:  [phone]


   Date of Appointment:  Mar 7, 2017


   Time of Appointment:  6:50pm


Psychiatrist:  


   Name:  Neeru Dalton


   Phone Number:  [phone]


   Date of Appointment:  Jan 9, 2017


   Time of Appointment:  1:00pm


   Appointment Notes:  Hospital follow up visit will be 30 minutes


Therapist:  


   Name:  Tati Lai


   Phone Number:  [phone]


   Appointment Notes:  Mailbox is full no one else picking up to schedule 

appointment


:  


   Name:  .none


Neurologist:  


   Name:  Dr. Vargas


   Phone Number:  [phone]


   Date of Appointment:  Jan 25, 2017


   Time of Appointment:  210pm


   Appointment Notes:  .


Specialist:  


   Name:  [facility]


   Phone Number:  [phone]


   Date of Appointment:  Jan 17, 2017


   Time of Appointment:  2:20 pm





Visit Code


E&M Code:  17221





Risk Factors Assessment


:  Yes


/single/:  Yes


Higher / Fall in social status:  No


Access to guns:  No


Health problems:  Yes


Mental Health Diagnoses:  Yes


Substance use disorders:  Yes


Previous attempt:  Yes


Previous psychiatric stay:  Yes


Hopelessness:  Yes


Smoker:  No





Protective Factors Assessment


Restorationist beliefs:  No


:  No


Responsible for young children:  Yes


Employed:  No


Stable relationships:  No


Supportive family:  No


Good rapport with provider:  Yes





Data


Vital Signs Last 24 Hrs:











  Date Time  Temp Pulse Resp B/P Pulse Ox O2 Delivery O2 Flow Rate FiO2


 


12/31/16 06:52 36.8 61 16 110/71    





  84  119/81    








Meds Administered Last 24 Hrs:





Meds Administered (Past 24Hrs)








 Medications


  (Trade)  Dose


 Ordered  Sig/Evelia


 Route  Start Time


 Stop Time Status Last Admin


Dose Admin


 


 Magnesium Oxide


  (Mag-Ox Tab)  400 mg  QAM


 PO  12/29/16 09:00


 1/28/17 08:59  12/30/16 09:02


400 MG

## 2017-01-01 NOTE — PSYCHIATRIC PROGRESS NOTES
Progress Note


Date of Service


Jan 1, 2017.





Interval History


40 yo female admitted voluntarily on 12/27/16 with severe depression and 

suicidality, having attempted to OD on ativan but was stopped by her boyfriend.

  She has  along history of mental health treatment for PTSD, depression and 

borderline personality disorder.





Chief Complaint


"I am in so much pain".





Subjective


Patient was seen & assessed interval progress reviewed with  nursing staff  


She is up and attending groups but has variable interactions with other 

patients (e.g. arguing with a thought disordered patient) 


She is focussed on tx of her pain


Rates mood as 4/10


BF did come in yesterday, and they again did not discuss the CYS visit


She had a panic-like attack yesterday able to reduce distress using verbal 

prompts from staff


She took tramadol and vistaril last night and slept 6.5hours.





She was seen in her bed today in darkened room initially speaking to provider 

from under blanket in a whimpering voice then removes blanket and shares she is 

in significant pain focussed on botox injections when outpatient.  She readily 

admits that she is not doing the behavioral things that help to provide some 

releive (e.g. TENS unit, heat application, massaging her jaw or distraction) "I 

am in too much pain" but then states she wants to learn to manage/cope with her 

pain. She did not do the Thought Record ABC sheets stating it was due to pain.  

SHe notes she remains distressed andanxious and has suicidal thoughts passive 

of wishing life were over while on the unit due to her pain.  SHe cannot 

contract for safety if discharged and cannot state ways to be safe if she 

remains in this much pain.  Denies intention or plan of hurting herself on the 

unit at this time.





Review of Systems


SIgnificant left sided head and jaw pain that radiates into her neck and back 

muscles.


She denies other physical concerns at this time on ROS





Sleep Information


Total Hours of Sleep:  6.50





Meal Information


Percent of Breakfast Consumed:  0


Percent of Lunch Consumed:  100


Percent of Dinner Consumed:  100





Mental Status Exam


During interview pt is:  alert and oriented


Appearance:  appropriately groomed, disheveled (in bed with hello renee pj 

bottoms, t-shirt and star wars pillow case, hair in disarray)


Eye contact is:  poor


Motor behavior is:  no abnormal motor movements, other (hiding under her 

blanket at first and appears pained, followed by matter of fact conversation)


Speech:  loud, other (initially slow and pained then becomes r/r/r/v soft tone, 

when reminded of her HA she again becomes slow and pained)


Affect:  blunted, anxious


Mood is:  depressed, anxious


Thought process:  perseveration (regarding pain issues seems to ignore her 

social stressors)


Thought content:  reality based without delusions


Suicidal thought are:  present (but conditional on pain)


Homicidal thoughts are:  denied


Hallucinations:  denies auditory, denies visual


Cognition:  memory grossly intact, attention grossly intact


Intelligence estimated to be:  average


Insight:  impaired


Judgement:  impaired





Medication Trials





(1) past psych meds


Prozac, paxil, lithium, depakote, risperdal, zyprexa, lamictal, sertraline, 

amitriptyline, cymbalta  Last Edited By: Zhanna Fuchs on Dec 28, 2016 10:46





Impression


Suicidality is passive but correlates with pain.  She remains focussed on pain 

and has an outpatient she has decided to proceed with the recommended botox 

injections.  She continues to refuse psychotropic medications, saying that she 

has had many trials in the past without benefit, but is participation in group 

and individual counseling with some success.  Her coping strategies and 

interpersonal relationships remain problematic, and will need to have long term 

therapy for her BPD.  If she can sustain several days in which she is in good 

emotional and behavioral control, could consider discharge.





Continued Inpatient Care


The patient continues to require in patient care due to the severity of her 

condition, and the risk for self harm if discharged.





Plan





(1) Major depressive disorder, recurrent severe without psychotic features


12/28


   - Q 15 min checks for safety


   - Encourage participation in group and individual counseling


   - Obtain OP records from [place] and from her therapist


   - Recommend family meeting with those with whom she lives. 


   - Patient currently refusing a trial of ADM's


   - Coordinate aftercare with current providers


   - Assist the patient to learn and utilize healthy coping strategies


   - Safety plan


12/29


   - Meeting with boyfriend Jackson yesterday


   - Assist the patient to improve her emotional vocabulary and willing to 

express her emotions assertively


12/31/16 patient continues to report depressed mood and SI based on her pain


             she continues to decline medications for depression and anxiety, 

but is open to CBT, will attempt to engage her in routine use of ABC worksheets





1/1/17 again reinforced if her goal is to learn how to manage with her pain, 

and she declines medications to engage in formal CBT using the ABC "thought 

record" worksheets, asking her to do 2 today





(2) Chronic post-traumatic stress disorder (PTSD)


12/28/16


   - Encourage healthy coping strategies


   - Individual and group therapies


12/31/16 continue as above, again work on CBT with ABC sheets for mood, perhaps 

at times PTSD topics will be touched on at patient's leading


1/1/17 as above





(3) SIERRA (generalized anxiety disorder)


12/28 and continuing


   - Ativan DC'd s/p OD and will need to coordinate use with OP provider


   -  Assist the patient to learn and utilize healthy coping strategies


   - Vistaril prn for anxiety or sleep


   - Encourage mindfulness, deep breathing, relaxation and exercise





(4) Panic disorder with agoraphobia


12/28 and continuing


   - see interventions for SIERRA





(5) Borderline personality disorder


12/28 and continuing


   - Be consistent in all boundaries


   - Assist the patient to have good boundaries with peers


   - Encourage healthy coping strategies, and discourage dysfunctional ones


   - Coordinate with OP therapist (consider DBT group at [facility] if 

elligible and she can travel that distance)





(6) Cannabis abuse


12/28 and continuing


   - Recommend abstinence


   - Educate about the negative impact of substances to mood





(7) Chronic pain


Patient has declined further psychotropic trials of medication that may impact 

pain


She has been seen by pain management and been given trial injections indicating 

that she is a candidate for outpatient botox


She has stated desire to learn how to use CBT to cope with her pain but then 

has not used the exercises/techniques offered to her


She has known behavioral strategies that help her pain (TENS, heat application, 

massage and distraction and routine use of her tramadol) but also has not used 

those either.  Encourage her to use these strategies


Encouraged her to take her tramadol consistently.


Consider referral to Clinical Health Psychology at [facility] if 

patient can travel that distance for psychological plan for living with chronic 

pain.








Discharge / Aftercare Planning


Primary Care Physician:  


   Name:  Dr Carr


   Phone Number:  [phone]


   Date of Appointment:  Mar 7, 2017


   Time of Appointment:  6:50pm


Psychiatrist:  


   Name:  Neeru Dalton


   Phone Number:  [phone]


   Date of Appointment:  Jan 9, 2017


   Time of Appointment:  1:00pm


   Appointment Notes:  Hospital follow up visit will be 30 minutes


Therapist:  


   Name:  Tati Lai


   Phone Number:  [phone]


   Appointment Notes:  Mailbox is full no one else picking up to schedule 

appointment


:  


   Name:  .none


Neurologist:  


   Name:  Dr. Vargas


   Phone Number:  [phone]


   Date of Appointment:  Jan 25, 2017


   Time of Appointment:  210pm


   Appointment Notes:  .


Specialist:  


   Name:  [facility]


   Phone Number:  [phone]


   Date of Appointment:  Jan 17, 2017


   Time of Appointment:  2:20 pm





Visit Code


E&M Code:  25019





Risk Factors Assessment


:  Yes


/single/:  Yes


Higher / Fall in social status:  No


Access to guns:  No


Health problems:  Yes


Mental Health Diagnoses:  Yes


Substance use disorders:  Yes


Previous attempt:  Yes


Previous psychiatric stay:  Yes


Hopelessness:  Yes


Smoker:  No





Protective Factors Assessment


Adventism beliefs:  No


:  No


Responsible for young children:  Yes


Employed:  No


Stable relationships:  No


Supportive family:  No


Good rapport with provider:  Yes





Data


Vital Signs Last 24 Hrs:











  Date Time  Temp Pulse Resp B/P Pulse Ox O2 Delivery O2 Flow Rate FiO2


 


1/1/17 06:51 36.4 83 16 106/69

## 2017-01-02 NOTE — PSYCHIATRIC PROGRESS NOTES
Progress Note


Date of Service


Jan 2, 2017.





Interval History


40 yo female admitted voluntarily on 12/27/16 with severe depression and 

suicidality, having attempted to OD on ativan but was stopped by her boyfriend.

  She has  along history of mental health treatment for PTSD, depression and 

borderline personality disorder.





Chief Complaint


"I almost just blacked out".





Subjective


Patient was seen & assessed interval progress reviewed with Treatment Team. 

Staff report she stayed in bed most of the day yesterday, reported pain, 

refused groups and meals. She remains focused on her pain, with conditional SI, 

saying she will become suicidal if her pain is not better controlled. She is 

able to list interventions that help for her pain, but doesn't always follow 

through on them. Today she was seen in her room where she is lying in the dark 

in bed. She says she "almost blacked out" in group, and had to have staff help 

her back to her room. She says she "woke up in pain today, it's not a good day.

" She was able to eat her breakfast. She says she is drinking lots of fluids 

and eating snacks, and has several bottles of soda and many sugary snacks on 

her bedside table. She says she is working on her safety plan, which includes 

"better communication, knowing what to do when the pain is bad, being more 

assertive instead of aggressive with my needs." She says her pain is the thing 

that triggers "all the other stuff, my anxiety, and suicidal thoughts." She 

denies SI today, but says she "doesn't feel safe to go home, I'm not allowed to 

go back home like this, I have to feel better." She says she is overwhelmed by 

her pain and anxiety currently, and reports "panic attacks all day, flashbacks.

" She is working on her patient workbook and says the page on catastrophic 

thinking really applies to her, as "I do every single thing on that page."





Sleep Information


Total Hours of Sleep:  5.75





Meal Information


Percent of Breakfast Consumed:  90


Percent of Lunch Consumed:  100


Percent of Dinner Consumed:  100





Mental Status Exam


During interview pt is:  alert and oriented, other (lying in bed in darkened 

room)


Appearance:  appropriately groomed, disheveled


Eye contact is:  poor, other (initially lies down with hand over eyes, but 

later sat up, made better eye contact)


Motor behavior is:  no abnormal motor movements, other (hiding under her 

blanket at first and appears pained, followed by matter of fact conversation)


Speech:  other (dramatic style of speech)


Affect:  depressed


Mood is:  depressed, anxious


Thought process:  perseveration (regarding pain issues seems to ignore her 

social stressors)


Thought content:  reality based without delusions


Suicidal thought are:  present (but conditional on pain)


Homicidal thoughts are:  denied


Hallucinations:  denies auditory, denies visual


Cognition:  memory grossly intact, attention grossly intact


Intelligence estimated to be:  average


Insight:  impaired


Judgement:  impaired





Medication Trials





(1) past psych meds


Prozac, paxil, lithium, depakote, risperdal, zyprexa, lamictal, sertraline, 

amitriptyline, cymbalta  Last Edited By: Zhanna Fuchs on Dec 28, 2016 10:46





Impression


Suicidality is passive but correlates with pain.  She remains focussed on pain 

and has decided to proceed with the recommended botox injections.  She 

continues to refuse psychotropic medications, saying that she has had many 

trials in the past without benefit, but is participating in group and 

individual counseling with some success.  Her coping strategies and 

interpersonal relationships remain problematic, and will need to have long term 

therapy for her BPD.  If she can sustain several days in which she is in good 

emotional and behavioral control, could consider discharge.





Continued Inpatient Care


The patient continues to require in patient care due to the severity of her 

condition, and the risk for self harm if discharged.





Plan





(1) Major depressive disorder, recurrent severe without psychotic features


12/28


   - Q 15 min checks for safety


   - Encourage participation in group and individual counseling


   - Obtain OP records from [place] and from her therapist


   - Recommend family meeting with those with whom she lives. 


   - Patient currently refusing a trial of ADM's


   - Coordinate aftercare with current providers


   - Assist the patient to learn and utilize healthy coping strategies


   - Safety plan


12/29


   - Meeting with boyfriend Jackson yesterday


   - Assist the patient to improve her emotional vocabulary and willing to 

express her emotions assertively


12/31/16 patient continues to report depressed mood and SI based on her pain


             she continues to decline medications for depression and anxiety, 

but is open to CBT, will attempt to engage her in routine use of ABC worksheets





1/1/17 


   - again reinforced if her goal is to learn how to manage with her pain, and 

she declines medications, resistance to engage in formal CBT using the ABC 

"thought record" worksheets, asking her to do 2 today





1/2/17


   - Needs frequent reinforcement of goals of hospitalization and what might 

actually be accomplished (safety plan and coping skills for living with chronic 

pain, not cessation of pain).


   - Safety plan: Boyfriend confirmed no weapons in home, someone else should 

keep all meds locked and dispense to her so she doesn't have access to a large 

amount of pills.





(2) Chronic post-traumatic stress disorder (PTSD)


12/28/16


   - Encourage healthy coping strategies


   - Individual and group therapies


12/31/16 continue as above, again work on CBT with ABC sheets for mood, perhaps 

at times PTSD topics will be touched on at patient's leading


1/1/17 as above





(3) SIERRA (generalized anxiety disorder)


12/28 and continuing


   - Ativan DC'd s/p OD and will need to coordinate use with OP provider


   -  Assist the patient to learn and utilize healthy coping strategies


   - Vistaril prn for anxiety or sleep


   - Encourage mindfulness, deep breathing, relaxation and exercise





(4) Panic disorder with agoraphobia


12/28 and continuing


   - see interventions for SIERRA





(5) Borderline personality disorder


12/28 and continuing


   - Be consistent in all boundaries


   - Assist the patient to have good boundaries with peers


   - Encourage healthy coping strategies, and discourage dysfunctional ones


   - Coordinate with OP therapist (consider DBT group at [facility] if 

elligible and she can travel that distance)





(6) Cannabis abuse


12/28 and continuing


   - Recommend abstinence


   - Educate about the negative impact of substances to mood





(7) Chronic pain


Patient has declined further psychotropic trials of medication that may impact 

pain


She has been seen by pain management and been given trial injections indicating 

that she is a candidate for outpatient botox


She has stated desire to learn how to use CBT to cope with her pain but then 

has not used the exercises/techniques offered to her


She has known behavioral strategies that help her pain (TENS, heat application, 

massage and distraction and routine use of her tramadol) but also has not used 

those either.  Encourage her to use these strategies


Encouraged her to take her tramadol consistently.


Consider referral to Clinical Health Psychology at [facility] if 

patient can travel that distance for psychological plan for living with chronic 

pain.








Discharge / Aftercare Planning


Primary Care Physician:  


   Name:  Dr Carr


   Phone Number:  [phone]


   Date of Appointment:  Mar 7, 2017


   Time of Appointment:  6:50pm


Psychiatrist:  


   Name:  Neeru Dalton


   Phone Number:  [phone]


   Date of Appointment:  Jan 9, 2017


   Time of Appointment:  1:00pm


   Appointment Notes:  Hospital follow up visit will be 30 minutes


Therapist:  


   Name:  Tati Lai


   Phone Number:  [phone]


   Appointment Notes:  Mailbox is full no one else picking up to schedule 

appointment


:  


   Name:  .none


Neurologist:  


   Name:  Dr. Vargas


   Phone Number:  [phone]


   Date of Appointment:  Jan 25, 2017


   Time of Appointment:  210pm


   Appointment Notes:  .


Specialist:  


   Name:  [facility]


   Phone Number:  [phone]


   Date of Appointment:  Jan 17, 2017


   Time of Appointment:  2:20 pm





Visit Code


E&M Code:  01622





Risk Factors Assessment


:  Yes


/single/:  Yes


Higher / Fall in social status:  No


Access to guns:  No


Health problems:  Yes


Mental Health Diagnoses:  Yes


Substance use disorders:  Yes


Previous attempt:  Yes


Previous psychiatric stay:  Yes


Hopelessness:  Yes


Smoker:  No





Protective Factors Assessment


Taoist beliefs:  No


:  No


Responsible for young children:  Yes


Employed:  No


Stable relationships:  No


Supportive family:  No


Good rapport with provider:  Yes





Data


Vital Signs Last 24 Hrs:











  Date Time  Temp Pulse Resp B/P Pulse Ox O2 Delivery O2 Flow Rate FiO2


 


1/2/17 06:58 36.9 63 16 106/69

## 2017-01-03 NOTE — PSYCHIATRIC PROGRESS NOTES
Progress Note


Date of Service


Dave 3, 2017.





Interval History


40 yo female admitted voluntarily on 12/27/16 with severe depression and 

suicidality, having attempted to OD on ativan but was stopped by her boyfriend.

  She has  along history of mental health treatment for PTSD, depression and 

borderline personality disorder.





Chief Complaint


"There's a horrible loop in my head.".





Subjective


Patient was seen & assessed interval progress reviewed with Treatment Team.  

The patient says that she has a "terrible loop" of negative thoughts in her 

head again today.  She says that she has them every day, that these are not 

new.  She also reports chronic flashbacks and nightmares, last night having a 

nightmare that her brother was choking her.  She says that she has "thousands 

of triggers" to her PTSD, and cannot list them all.  She continues to complain 

of HA pain, and was informed that botox injections are an OP procedure with an 

appt on 1/17/17 with pain management.  She describes her anxiety today as 

having "knots in my stomach" and "panic all the time".  She rates her mood 

today 3/10 but denies SI.  She talks about her pain causing her to be someone 

she does not want to be, that she is mean and acts out toward others when in 

pain.  "Its a big problem for my family.".  She talks about having had many 

losses/death in her life that she has not adequately processed, and says "I don'

t know who I am.".  Nursing reports that the patient had an extreme reaction to 

learning that she would be discharged shortly claiming to have too much pain to 

go home, and that she was suicidal.  She even called her boyfriend who then 

called in to say that he would report us to the AMA if we discharged her.





Review of Systems


Constitutional:  + fatigue


ENT:  No dental problems, No hearing loss, No nasal symptoms, No problem 

reported, No sore throat, No tinnitus, No trouble swallowing, No unusual 

epistaxis


Respiratory:  No cough, No dyspnea at rest, No dyspnea on exertion, No 

hemoptysis, No problem reported, No shortness of breath, No sputum, No wheezing


Cardiovascular:  No PND, No chest pain, No claudication, No edema, No orthopnea

, No palpitations, No problem reported


Abdomen:  No GI bleeding, No constipation, No diarrhea, No nausea, No pain, No 

problem reported, No vomiting


Musculoskeletal:  No calf pain, No joint pain, No muscle pain, No problem 

reported, No swelling


Neurologic:  + problem reported (headaches)


Psychiatric:  + anxiety (with panic and agorabphobia), + depression symptoms





Sleep Information


Total Hours of Sleep:  4.50





Meal Information


Percent of Breakfast Consumed:  0


Percent of Lunch Consumed:  0


Percent of Dinner Consumed:  50





Mental Status Exam


Appearance:  appropriately dressed, appropriately groomed


Eye contact is:  good


Motor behavior is:  no abnormal motor movements


Speech:  normal in rate, rhythm & volume


Affect:  blunted


Mood is:  depressed, anxious


Thought process:  goal directed (but focused on many past issues)


Thought content:  reality based without delusions


Suicidal thought are:  denied


Homicidal thoughts are:  denied


Hallucinations:  denies auditory, denies visual


Cognition:  memory grossly intact, attention grossly intact


Intelligence estimated to be:  average


Insight:  impaired


Judgement:  impaired





Medication Trials





(1) past psych meds


Prozac, paxil, lithium, depakote, risperdal, zyprexa, lamictal, sertraline, 

amitriptyline, cymbalta  Last Edited By: Zhanna Fuchs on Dec 28, 2016 10:46





Impression


Suicidality is passive but correlates with pain.  She remains focussed on pain 

and has decided to proceed with the recommended botox injections.  She 

continues to refuse psychotropic medications, saying that she has had many 

trials in the past without benefit, but is participating in group and 

individual counseling with some success.  Her coping strategies and 

interpersonal relationships remain problematic, and will need to have long term 

therapy for her BPD.  I have suggested, as have others in her past, that EMDR 

would be a good approach to targe her PTSD, and recommended she explore this 

with her therapist or insurance. Her problems are chronic and will not 

necessarily benefit from a short term hospital stay. If she can sustain several 

days in which she is in good emotional and behavioral control, could consider 

discharge.





Continued Inpatient Care


The patient continues to require in patient care due to the severity of her 

condition, and the risk for self harm if discharged.





Plan





(1) Major depressive disorder, recurrent severe without psychotic features


12/28


   - Q 15 min checks for safety


   - Encourage participation in group and individual counseling


   - Obtain OP records from [facility] and from her therapist


   - Recommend family meeting with those with whom she lives. 


   - Patient currently refusing a trial of ADM's


   - Coordinate aftercare with current providers


   - Assist the patient to learn and utilize healthy coping strategies


   - Safety plan


12/29


   - Meeting with jonatan Paz yesterday


   - Assist the patient to improve her emotional vocabulary and willing to 

express her emotions assertively


12/31/16 patient continues to report depressed mood and SI based on her pain


             she continues to decline medications for depression and anxiety, 

but is open to CBT, will attempt to engage her in routine use of ABC worksheets





1/1/17 


   - again reinforced if her goal is to learn how to manage with her pain, and 

she declines medications, resistance to engage in formal CBT using the ABC 

"thought record" worksheets, asking her to do 2 today





1/2/17


   - Needs frequent reinforcement of goals of hospitalization and what might 

actually be accomplished (safety plan and coping skills for living with chronic 

pain, not cessation of pain).


   - Safety plan: [place] confirmed no weapons in home, someone else should 

keep all meds locked and dispense to her so she doesn't have access to a large 

amount of pills.





(2) Chronic post-traumatic stress disorder (PTSD)


12/28/16


   - Encourage healthy coping strategies


   - Individual and group therapies


12/31/16 continue as above, again work on CBT with ABC sheets for mood, perhaps 

at times PTSD topics will be touched on at patient's leading


1/1/17 as above


1/3/17


   - Recommned patient explore EMDR therapy





(3) SIERRA (generalized anxiety disorder)


12/28 and continuing


   - Ativan DC'd s/p OD and will need to coordinate use with OP provider


   -  Assist the patient to learn and utilize healthy coping strategies


   - Vistaril prn for anxiety or sleep


   - Encourage mindfulness, deep breathing, relaxation and exercise


1/3/17


   - Encourage patient to be grounded in the present, and focused on current 

coping strategies





(4) Panic disorder with agoraphobia


12/28 and continuing


   - see interventions for SIERRA





(5) Borderline personality disorder


12/28 and continuing


   - Be consistent in all boundaries


   - Assist the patient to have good boundaries with peers


   - Encourage healthy coping strategies, and discourage dysfunctional ones


   - Coordinate with OP therapist (consider DBT group at [facility] if 

elligible and she can travel that distance)





(6) Cannabis abuse


12/28 and continuing


   - Recommend abstinence


   - Educate about the negative impact of substances to mood





(7) Chronic pain


Patient has declined further psychotropic trials of medication that may impact 

pain


She has been seen by pain management and been given trial injections indicating 

that she is a candidate for outpatient botox


She has stated desire to learn how to use CBT to cope with her pain but then 

has not used the exercises/techniques offered to her


She has known behavioral strategies that help her pain (TENS, heat application, 

massage and distraction and routine use of her tramadol) but also has not used 

those either.  Encourage her to use these strategies


Encouraged her to take her tramadol consistently.


Consider referral to Clinical Health Psychology at [facility] if 

patient can travel that distance for psychological plan for living with chronic 

pain.








Discharge / Aftercare Planning


Primary Care Physician:  


   Name:  Dr Carr


   Phone Number:  [phone]


   Date of Appointment:  Mar 7, 2017


   Time of Appointment:  6:50pm


Psychiatrist:  


   Name:  Neeru Dalton


   Phone Number:  [phone]


   Date of Appointment:  Jan 9, 2017


   Time of Appointment:  1:00pm


   Appointment Notes:  Hospital follow up visit will be 30 minutes


Therapist:  


   Name:  Tati Lai


   Phone Number:  [phone]


   Appointment Notes:  Mailbox is full no one else picking up to schedule 

appointment


:  


   Name:  .none


Neurologist:  


   Name:  Dr. Vargas


   Phone Number:  [phone]


   Date of Appointment:  Jan 25, 2017


   Time of Appointment:  210pm


   Appointment Notes:  .


Specialist:  


   Name:  [facility]


   Phone Number:  [phone]


   Date of Appointment:  Jan 17, 2017


   Time of Appointment:  2:20 pm





Visit Code


E&M Code:  44798





Risk Factors Assessment


:  Yes


/single/:  Yes


Higher / Fall in social status:  No


Access to guns:  No


Health problems:  Yes


Mental Health Diagnoses:  Yes


Substance use disorders:  Yes


Previous attempt:  Yes


Previous psychiatric stay:  Yes


Hopelessness:  Yes


Smoker:  No





Protective Factors Assessment


Scientology beliefs:  No


:  No


Responsible for young children:  Yes


Employed:  No


Stable relationships:  No


Supportive family:  No


Good rapport with provider:  Yes





Data


Vital Signs Last 24 Hrs:











  Date Time  Temp Pulse Resp B/P Pulse Ox O2 Delivery O2 Flow Rate FiO2


 


1/3/17 06:49 36.6 70 16 101/60    








Meds Administered Last 24 Hrs:





 Current Inpatient Medications








 Medications


  (Trade)  Dose


 Ordered  Sig/Evelia


 Route  Start Time


 Stop Time Status Last Admin


Dose Admin


 


 Acetaminophen


  (Tylenol Tab)  650 mg  Q4H  PRN


 PO  12/27/16 02:30


 1/26/17 02:29   


 


 


 Al Hydroxide/Mg


 Hydroxide


  (Maalox Susp)  30 ml  Q4H  PRN


 PO  12/27/16 02:30


 1/26/17 02:29   


 


 


 Bismuth


 Subsalicylate


  (Kaopectate Liqd)  15 ml  DAILY  PRN


 PO  12/27/16 02:30


 1/26/17 02:29   


 


 


 Magnesium


 Hydroxide


  (Milk Of


 Magnesia Susp)  30 ml  DAILY  PRN


 PO  12/27/16 02:30


 1/26/17 02:29   


 


 


 Sodium Chloride


  (Ocean Nasal


 Spray)    PRN  PRN


 NA  12/27/16 02:30


 1/26/17 02:29   


 


 


 Hydroxyzine HCl


  (Vistaril Tab)  50 mg  HSZ  PRN


 PO  12/27/16 02:30


 1/26/17 02:29  1/2/17 23:44


50 MG


 


 Hydroxyzine HCl


  (Vistaril Tab)  25 mg  Q4H  PRN


 PO  12/27/16 02:30


 1/26/17 02:29   


 


 


 Prazosin HCl


  (Prazosin)  1 mg  HS


 PO  12/27/16 22:00


 1/26/17 21:59  1/2/17 21:32


1 MG


 


 Ondansetron HCl


  (Zofran Tab)  4 mg  Q6H  PRN


 PO  12/27/16 02:30


 1/26/17 02:29  1/2/17 10:21


4 MG


 


 Baclofen


  (Lioresal Tab)  10 mg  TID


 PO  12/27/16 09:00


 1/26/17 08:59  1/3/17 09:39


10 MG


 


 Tramadol HCl


  (Ultram Tab)  50 mg  Q6H  PRN


 PO  12/27/16 02:30


 1/26/17 02:29  1/3/17 09:45


50 MG


 


 Haloperidol


 Lactate


  (Haldol Inj)  5 mg  Q4  PRN


 IM  12/27/16 09:30


 1/26/17 09:29   


 


 


 Haloperidol


  (Haldol Tab)  5 mg  Q4H  PRN


 PO  12/27/16 09:30


 1/26/17 09:29   


 


 


 Lorazepam


  (Ativan Inj)  1 mg  Q4H  PRN


 IM  12/27/16 09:30


 1/26/17 09:29   


 


 


 Lorazepam


  (Ativan Tab)  1 mg  Q4  PRN


 PO  12/27/16 09:30


 1/26/17 09:29   


 


 


 Magnesium Oxide


  (Mag-Ox Tab)  400 mg  QAM


 PO  12/29/16 09:00


 1/28/17 08:59  1/3/17 09:39


400 MG








Lab Results Last 24 Hrs:


12/26/16 17:50








Red Blood Count 4.67, Mean Corpuscular Volume 89.1, Mean Corpuscular Hemoglobin 

30.2, Mean Corpuscular Hemoglobin Concent 33.9, Mean Platelet Volume 9.6, 

Neutrophils (%) (Auto) 86.8, Lymphocytes (%) (Auto) 8.4, Monocytes (%) (Auto) 

4.2, Eosinophils (%) (Auto) 0.2, Basophils (%) (Auto) 0.2, Neutrophils # (Auto) 

11.22, Lymphocytes # (Auto) 1.09, Monocytes # (Auto) 0.54, Eosinophils # (Auto) 

0.02, Basophils # (Auto) 0.03





12/26/16 17:50

















Test


  12/26/16


17:50 12/26/16


17:52 12/26/16


17:55


 


White Blood Count


  12.92 K/uL


(4.8-10.8) 


  


 


 


Red Blood Count


  4.67 M/uL


(4.2-5.4) 


  


 


 


Hemoglobin


  14.1 g/dL


(12.0-16.0) 


  


 


 


Hematocrit 41.6 % (37-47)   


 


Mean Corpuscular Volume


  89.1 fL


() 


  


 


 


Mean Corpuscular Hemoglobin


  30.2 pg


(25-34) 


  


 


 


Mean Corpuscular Hemoglobin


Concent 33.9 g/dl


(32-36) 


  


 


 


Platelet Count


  307 K/uL


(130-400) 


  


 


 


Mean Platelet Volume


  9.6 fL


(7.4-10.4) 


  


 


 


Neutrophils (%) (Auto) 86.8 %   


 


Lymphocytes (%) (Auto) 8.4 %   


 


Monocytes (%) (Auto) 4.2 %   


 


Eosinophils (%) (Auto) 0.2 %   


 


Basophils (%) (Auto) 0.2 %   


 


Neutrophils # (Auto)


  11.22 K/uL


(1.4-6.5) 


  


 


 


Lymphocytes # (Auto)


  1.09 K/uL


(1.2-3.4) 


  


 


 


Monocytes # (Auto)


  0.54 K/uL


(0.11-0.59) 


  


 


 


Eosinophils # (Auto)


  0.02 K/uL


(0-0.5) 


  


 


 


Basophils # (Auto)


  0.03 K/uL


(0-0.2) 


  


 


 


RDW Standard Deviation


  43.6 fL


(36.4-46.3) 


  


 


 


RDW Coefficient of Variation


  13.5 %


(11.5-14.5) 


  


 


 


Immature Granulocyte % (Auto) 0.2 %   


 


Immature Granulocyte # (Auto)


  0.02 K/uL


(0.00-0.02) 


  


 


 


Anion Gap


  11.0 mmol/L


(3-11) 


  


 


 


Est Creatinine Clear Calc


Drug Dose 102.2 ml/min 


  


  


 


 


Estimated GFR (


American) 93.3 


  


  


 


 


Estimated GFR (Non-


American 80.5 


  


  


 


 


BUN/Creatinine Ratio 13.8 (10-20)   


 


Osmolality


  290 mOsm/kg


(280-300) 


  


 


 


Calcium Level


  8.5 mg/dl


(8.5-10.1) 


  


 


 


Total Bilirubin


  0.7 mg/dl


(0.2-1) 


  


 


 


Direct Bilirubin


  0.1 mg/dl


(0-0.2) 


  


 


 


Aspartate Amino Transf


(AST/SGOT) 12 U/L (15-37) 


  


  


 


 


Alanine Aminotransferase


(ALT/SGPT) 14 U/L (12-78) 


  


  


 


 


Alkaline Phosphatase


  63 U/L


() 


  


 


 


Total Creatine Kinase


  34 U/L


() 


  


 


 


Creatine Kinase MB


  < 0.5 ng/ml


(0.5-3.6) 


  


 


 


Creatine Kinase MB Ratio  (0-3.0)   


 


Troponin I


  < 0.015 ng/ml


(0-0.045) 


  


 


 


Total Protein


  7.9 gm/dl


(6.4-8.2) 


  


 


 


Albumin


  4.1 gm/dl


(3.4-5.0) 


  


 


 


Lipase


  124 U/L


() 


  


 


 


Human Chorionic Gonadotropin,


Qual NEG (NEG) 


  


  


 


 


Salicylates Level


  < 1.7 mg/dl


(2.8-20) 


  


 


 


Acetaminophen Level


  < 2 ug/ml


(10-30) 


  


 


 


Ethyl Alcohol mg/dL


  < 3.0 mg/dl


(0-3) 


  


 


 


Prothrombin Time


  


  10.4 SECONDS


(9.0-12.0) 


 


 


Prothromb Time International


Ratio 


  1.0 (0.9-1.1) 


  


 


 


Activated Partial


Thromboplast Time 


  25.8 SECONDS


(21.0-31.0) 


 


 


Partial Thromboplastin Ratio  1.0  


 


Urine Color   YELLOW 


 


Urine Appearance   CLEAR (CLEAR) 


 


Urine pH   6.0 (4.5-7.5) 


 


Urine Specific Gravity


  


  


  1.011


(1.000-1.030)


 


Urine Protein   NEG (NEG) 


 


Urine Glucose (UA)   NEG (NEG) 


 


Urine Ketones   NEG (NEG) 


 


Urine Occult Blood   NEG (NEG) 


 


Urine Nitrite   NEG (NEG) 


 


Urine Bilirubin   NEG (NEG) 


 


Urine Urobilinogen   NEG (NEG) 


 


Urine Leukocyte Esterase   NEG (NEG) 


 


Urine Synthetic Stimulants   see note 


 


Urine Opiates Screen   NEG (NEG) 


 


Urine Methadone, Qualitative   NEG (NEG) 


 


Urine Barbiturates   NEG (NEG) 


 


Urine Phencyclidine (PCP)


Level 


  


  NEG (NEG) 


 


 


Ur


Amphetamine/Methamphetamine 


  


  NEG (NEG) 


 


 


MDMA (Ecstasy) Screen   NEG (NEG) 


 


Urine Benzodiazepines Screen   NEG (NEG) 


 


Urine Cocaine Metabolite   NEG (NEG) 


 


Cannabinoids Comment   see note 


 


Urine Synthetic Cannabinoids


  


  


  NEGATIVE


(Negative)


 


Ur Synthetic Cannabinoids


Confirm 


  


   (()) 


 


 


Urine Marijuana (THC)   POS (NEG) 


 


Urine Marijuana (THC Carboxy


Acid) 


  


  367 NG/ML


(CUTOFF=5)

## 2017-01-04 NOTE — DISCHARGE INSTRUCTIONS
Discharge Information


Report Includes


Report will include the:  Discharge Instructions & Summary





Admission


Admission Date / Time:  Dec 27, 2016 at 00:25


Reason for Admission:  Major Depressive Disorder-Recurrent





Discharge


Discharge Diagnosis / Problem:  Major depressive disorder, borderline 

personality disorder, PTSD,


Condition at Discharge:  Good





Discharge Goals


Goal(s):  Decrease discomfort, Increase independence, Prevent Disease 

Progression





Activity Recommendations


Activity Limitations:  resume your previous activity





.





Instructions / Follow-Up


Instructions / Follow-Up


.





SPECIAL CARE INSTRUCTIONS:





1.  Follow through with your scheduled aftercare appointments.  If unable to 

keep an appointment, please call to reschedule.





2.  Take your medication only as prescribed.  Medication should not be changed 

or stopped without the approval of your doctor.  In the event of worsening 

symptoms or concerns about side effects, contact your doctor immediately.





3.  Utilize new healthy coping skills, anger management skills, and stress 

management skills learned during your hospitalization.  Journal feelings and 

process them with a support person.  Identify stressors or situations that may 

result in relapse, deterioration or inappropriate behaviors and develop a plan 

to deal with those issues.





4.  If your coping skills are ineffective and you are in crisis, contact your 

outpatient providers for direction.  If unable to reach your providers, please 

call the  [facility] HELP LINE AT [phone] or go to the closest Emergency Room.





5.  Avoid alcohol and un-prescribed drugs.





6.  You have been provided with the Mental Health Advance Directives Pamphlet 

for your review.








AFTERCARE APPOINTMENTS: 





*  Please call your insurance company prior to your scheduled appointment to 

confirm your aftercare providers are covered.  Take your insurance information 

to your appointments.





.





Discharge / Aftercare Planning


Primary Care Physician:  


   Name:  Dr Carr


   Phone Number:  [phone]


   Date of Appointment:  Mar 7, 2017


   Time of Appointment:  6:50pm


Psychiatrist:  


   Name:  Neeru Dalton


   Phone Number:  [phone]


   Date of Appointment:  Jan 9, 2017


   Time of Appointment:  1:00pm


   Appointment Notes:  Hospital follow up visit will be 30 minutes


Therapist:  


   Name Of Therapist:  Tati Lai


   Phone Number:  [phone]


   Appointment Comments:  Mailbox is full no one else picking up to schedule 

appointment


:  


   Name:  .none


Neurologist:  


   Name:  Dr. Vargas


   Phone Number:  [phone]


   Date of Appointment:  Jan 25, 2017


   Time of Appointment:  210pm


   Appointment Notes:  .


Specialist:  


   Name:  [facility]


   Phone Number:  [phone]


   Date of Appointment:  Jan 17, 2017


   Time of Appointment:  2:20 pm





.





Follow-Up Care


Plan for Follow-Up Care:


Close follow up with OP providers, appts made





Current Hospital Diet


Patient's current hospital diet: Regular Diet





Discharge Diet


Recommended Diet:  Regular Diet





Procedures


Procedures Performed:  No





Pending Studies


Pending Studies at Discharge:  No





Medical Emergencies








.


Who to Call and When:





Medical Emergencies:  


For questions or emergencies related to your hospital stay, please contact the 

Inpatient Behavioral Health Unit at [phone].





A psychiatric clinician is on-call 24/7 for the Behavioral Health Unit for 

emergencies





At any time you feel your situation is an emergency, you may also call 911 

immediately.





.





Non-Emergent Contact


Non-Emergency issues call your:  Primary Care Provider, Therapist





Advance Directives


Existing Advance Directive:  No


Do You Have an Existing Mental:  No


Existing Living Will:  No


Existing Power of :  No





Discharge Summary


Admission HPI


Per the Admitting provider:


Please see attached H&P





Hospital Course





(1) Major depressive disorder, recurrent severe without psychotic features


12/28


   - Q 15 min checks for safety


   - Encourage participation in group and individual counseling


   - Obtain OP records from [place] and from her therapist


   - Recommend family meeting with those with whom she lives. 


   - Patient currently refusing a trial of ADM's


   - Coordinate aftercare with current providers


   - Assist the patient to learn and utilize healthy coping strategies


   - Safety plan


12/29


   - Meeting with boyfrienfrance Paz yesterday


   - Assist the patient to improve her emotional vocabulary and willing to 

express her emotions assertively


12/31/16 patient continues to report depressed mood and SI based on her pain


             she continues to decline medications for depression and anxiety, 

but is open to CBT, will attempt to engage her in routine use of ABC worksheets





1/1/17 


   - again reinforced if her goal is to learn how to manage with her pain, and 

she declines medications, resistance to engage in formal CBT using the ABC 

"thought record" worksheets, asking her to do 2 today





1/2/17


   - Needs frequent reinforcement of goals of hospitalization and what might 

actually be accomplished (safety plan and coping skills for living with chronic 

pain, not cessation of pain).


   - Safety plan: Boyfriend confirmed no weapons in home, someone else should 

keep all meds locked and dispense to her so she doesn't have access to a large 

amount of pills.





(2) Chronic post-traumatic stress disorder (PTSD)


12/28/16


   - Encourage healthy coping strategies


   - Individual and group therapies


12/31/16 continue as above, again work on CBT with ABC sheets for mood, perhaps 

at times PTSD topics will be touched on at patient's leading


1/1/17 as above


1/3/17


   - Recommned patient explore EMDR therapy





(3) SIERRA (generalized anxiety disorder)


12/28 and continuing


   - Ativan DC'd s/p OD and will need to coordinate use with OP provider


   -  Assist the patient to learn and utilize healthy coping strategies


   - Vistaril prn for anxiety or sleep


   - Encourage mindfulness, deep breathing, relaxation and exercise


1/3/17


   - Encourage patient to be grounded in the present, and focused on current 

coping strategies





(4) Panic disorder with agoraphobia


12/28 and continuing


   - see interventions for SIERRA





(5) Borderline personality disorder


12/28 and continuing


   - Be consistent in all boundaries


   - Assist the patient to have good boundaries with peers


   - Encourage healthy coping strategies, and discourage dysfunctional ones


   - Coordinate with OP therapist (consider DBT group at [facility] if 

elligible and she can travel that distance)





(6) Cannabis abuse


12/28 and continuing


   - Recommend abstinence


   - Educate about the negative impact of substances to mood





(7) Chronic pain


Patient has declined further psychotropic trials of medication that may impact 

pain


She has been seen by pain management and been given trial injections indicating 

that she is a candidate for outpatient botox


She has stated desire to learn how to use CBT to cope with her pain but then 

has not used the exercises/techniques offered to her


She has known behavioral strategies that help her pain (TENS, heat application, 

massage and distraction and routine use of her tramadol) but also has not used 

those either.  Encourage her to use these strategies


Encouraged her to take her tramadol consistently.


Consider referral to Clinical Health Psychology at [facility] if 

patient can travel that distance for psychological plan for living with chronic 

pain.








Risk Factors Assessment


:  Yes


/single/:  Yes


Higher / Fall in social status:  No


Access to guns:  No


Health problems:  Yes


Mental Health Diagnoses:  Yes


Substance use disorders:  Yes


Previous attempt:  Yes


Previous psychiatric stay:  Yes


Hopelessness:  Yes


Smoker:  No





Protective Factors Assessment


Catholic beliefs:  No


:  No


Responsible for young children:  Yes


Employed:  No


Stable relationships:  No


Supportive family:  No


Good rapport with provider:  Yes





Day of Discharge Assessment


COURSE OF HOSPITALIZATION: During the patient's 8 day stay, the patient refused 

the addition of an antidepressant for her mood and anxiety saying that she's 

had many trials in the past that were ineffective.  She was continued on 

prazosin for nightmares and other medical medications.  Vistaril was used when 

necessary for anxiety.  The patient had a somewhat difficult course, continuing 

to state that she had conditional suicidal ideation if her pain was not well 

managed.  She had consults with both pain management and neuro.  She received 

trigger point injections from pain management here in the hospital which she 

said were less than effective.  It was recommended she pursue Botox injections 

as an outpatient which she has agreed to do and appointment has been made.  She 

had difficult relations with her boyfriend 10, frequently calling him in panic.

  She was quite dramatic, in the context of her borderline personality disorder

, and shows to communicate her needs in non-assertive ways.  At one point she 

reported a fall which was not witnessed and could not be supported by the facts 

(said she slipped on water, no water on the floor, no water on her clothing, no 

evidence of injury) and was encouraged to use healthier communication styles to 

get her messages across.  She was able to agree that she is somewhat impulsive 

in the context of her emotions and needed to learn to communicate using her 

words rather than behaviors.  She confirmed throughout her stay that her 

problems are all long-term, including her chronic PTSD, chronic panic, chronic 

conditional suicidal ideation.  These were not conditions that were amenable to 

a short-term hospitalization and it was recommended she engage in long-term 

outpatient treatment.  She was somewhat resistant to discharge, feeling that 

the chaos at home was something she could not tolerate.  Family meeting will be 

held with her boyfriend Jackson with whom she lives, this afternoon.





DAY OF DISCHARGE ASSESSMENT: The patient says that her mood is good and rated 7 

out of 10 with 10 being the best.  She denies suicidal ideation.  She is proud 

of herself that she was able to "keep it together" when her daughter had a 

medical emergency requiring an ER visit.  She is in good behavioral control 

today, can identify coping strategies to deal with extreme emotions.  She has 

prompt follow-up appointments with all of her providers.  The patient is 

accepting of discharge today after her family meeting with him this afternoon.  

Today the patient is casually and appropriately dressed and groomed.  Her gait 

and station are within normal limits, without limp or any evidence of pain.  

Eye contact is good.  Affect is smiling.  Speech is of normal rate volume and 

tone.  Thoughts are organized and goal directed and without evidence of thought 

disorder.  Recent and remote memory are intact per conversation.  Intelligence 

is estimated to be average.  Insight and judgment are improved over admission.





Laboratory


12/26/16 17:50








Red Blood Count 4.67, Mean Corpuscular Volume 89.1, Mean Corpuscular Hemoglobin 

30.2, Mean Corpuscular Hemoglobin Concent 33.9, Mean Platelet Volume 9.6, 

Neutrophils (%) (Auto) 86.8, Lymphocytes (%) (Auto) 8.4, Monocytes (%) (Auto) 

4.2, Eosinophils (%) (Auto) 0.2, Basophils (%) (Auto) 0.2, Neutrophils # (Auto) 

11.22, Lymphocytes # (Auto) 1.09, Monocytes # (Auto) 0.54, Eosinophils # (Auto) 

0.02, Basophils # (Auto) 0.03





12/26/16 17:50

















Test


  12/26/16


17:50 12/26/16


17:52 12/26/16


17:55


 


White Blood Count


  12.92 K/uL


(4.8-10.8) 


  


 


 


Red Blood Count


  4.67 M/uL


(4.2-5.4) 


  


 


 


Hemoglobin


  14.1 g/dL


(12.0-16.0) 


  


 


 


Hematocrit 41.6 % (37-47)   


 


Mean Corpuscular Volume


  89.1 fL


() 


  


 


 


Mean Corpuscular Hemoglobin


  30.2 pg


(25-34) 


  


 


 


Mean Corpuscular Hemoglobin


Concent 33.9 g/dl


(32-36) 


  


 


 


Platelet Count


  307 K/uL


(130-400) 


  


 


 


Mean Platelet Volume


  9.6 fL


(7.4-10.4) 


  


 


 


Neutrophils (%) (Auto) 86.8 %   


 


Lymphocytes (%) (Auto) 8.4 %   


 


Monocytes (%) (Auto) 4.2 %   


 


Eosinophils (%) (Auto) 0.2 %   


 


Basophils (%) (Auto) 0.2 %   


 


Neutrophils # (Auto)


  11.22 K/uL


(1.4-6.5) 


  


 


 


Lymphocytes # (Auto)


  1.09 K/uL


(1.2-3.4) 


  


 


 


Monocytes # (Auto)


  0.54 K/uL


(0.11-0.59) 


  


 


 


Eosinophils # (Auto)


  0.02 K/uL


(0-0.5) 


  


 


 


Basophils # (Auto)


  0.03 K/uL


(0-0.2) 


  


 


 


RDW Standard Deviation


  43.6 fL


(36.4-46.3) 


  


 


 


RDW Coefficient of Variation


  13.5 %


(11.5-14.5) 


  


 


 


Immature Granulocyte % (Auto) 0.2 %   


 


Immature Granulocyte # (Auto)


  0.02 K/uL


(0.00-0.02) 


  


 


 


Anion Gap


  11.0 mmol/L


(3-11) 


  


 


 


Est Creatinine Clear Calc


Drug Dose 102.2 ml/min 


  


  


 


 


Estimated GFR (


American) 93.3 


  


  


 


 


Estimated GFR (Non-


American 80.5 


  


  


 


 


BUN/Creatinine Ratio 13.8 (10-20)   


 


Osmolality


  290 mOsm/kg


(280-300) 


  


 


 


Calcium Level


  8.5 mg/dl


(8.5-10.1) 


  


 


 


Total Bilirubin


  0.7 mg/dl


(0.2-1) 


  


 


 


Direct Bilirubin


  0.1 mg/dl


(0-0.2) 


  


 


 


Aspartate Amino Transf


(AST/SGOT) 12 U/L (15-37) 


  


  


 


 


Alanine Aminotransferase


(ALT/SGPT) 14 U/L (12-78) 


  


  


 


 


Alkaline Phosphatase


  63 U/L


() 


  


 


 


Total Creatine Kinase


  34 U/L


() 


  


 


 


Creatine Kinase MB


  < 0.5 ng/ml


(0.5-3.6) 


  


 


 


Creatine Kinase MB Ratio  (0-3.0)   


 


Troponin I


  < 0.015 ng/ml


(0-0.045) 


  


 


 


Total Protein


  7.9 gm/dl


(6.4-8.2) 


  


 


 


Albumin


  4.1 gm/dl


(3.4-5.0) 


  


 


 


Lipase


  124 U/L


() 


  


 


 


Human Chorionic Gonadotropin,


Qual NEG (NEG) 


  


  


 


 


Salicylates Level


  < 1.7 mg/dl


(2.8-20) 


  


 


 


Acetaminophen Level


  < 2 ug/ml


(10-30) 


  


 


 


Ethyl Alcohol mg/dL


  < 3.0 mg/dl


(0-3) 


  


 


 


Prothrombin Time


  


  10.4 SECONDS


(9.0-12.0) 


 


 


Prothromb Time International


Ratio 


  1.0 (0.9-1.1) 


  


 


 


Activated Partial


Thromboplast Time 


  25.8 SECONDS


(21.0-31.0) 


 


 


Partial Thromboplastin Ratio  1.0  


 


Urine Color   YELLOW 


 


Urine Appearance   CLEAR (CLEAR) 


 


Urine pH   6.0 (4.5-7.5) 


 


Urine Specific Gravity


  


  


  1.011


(1.000-1.030)


 


Urine Protein   NEG (NEG) 


 


Urine Glucose (UA)   NEG (NEG) 


 


Urine Ketones   NEG (NEG) 


 


Urine Occult Blood   NEG (NEG) 


 


Urine Nitrite   NEG (NEG) 


 


Urine Bilirubin   NEG (NEG) 


 


Urine Urobilinogen   NEG (NEG) 


 


Urine Leukocyte Esterase   NEG (NEG) 


 


Urine Synthetic Stimulants   see note 


 


Urine Opiates Screen   NEG (NEG) 


 


Urine Methadone, Qualitative   NEG (NEG) 


 


Urine Barbiturates   NEG (NEG) 


 


Urine Phencyclidine (PCP)


Level 


  


  NEG (NEG) 


 


 


Ur


Amphetamine/Methamphetamine 


  


  NEG (NEG) 


 


 


MDMA (Ecstasy) Screen   NEG (NEG) 


 


Urine Benzodiazepines Screen   NEG (NEG) 


 


Urine Cocaine Metabolite   NEG (NEG) 


 


Cannabinoids Comment   see note 


 


Urine Synthetic Cannabinoids


  


  


  NEGATIVE


(Negative)


 


Ur Synthetic Cannabinoids


Confirm 


  


   (()) 


 


 


Urine Marijuana (THC)   POS (NEG) 


 


Urine Marijuana (THC Carboxy


Acid) 


  


  367 NG/ML


(CUTOFF=5)











Total Time


Total Time Spent (min):  Greater than 30 minutes


Total Time Included:  examination of the patient, discharge planning, 

medication reconciliation, communication with other providers





Tobacco Cessation at Discharge


FDA approved Prescription:  non-smoker

## 2017-01-04 NOTE — PSYCHIATRIC PROGRESS NOTES
Psychiatric Progress Note


Date of Service


Jan 4, 2017.





Notes


Spoke with Leigha GARCIA at [place], by phone to discuss DC of ativan on which 

patient attempted to OD.  She is in agreement with this and notes a policy not 

to prescribe meds that people have overdosed on.  I informed her of boyfriend's 

concern about this as well.

## 2017-01-09 NOTE — DIAGNOSTIC IMAGING REPORT
ULTRASOUND OF THE PELVIS 



CLINICAL HISTORY: Pelvic pain.



COMPARISON STUDY: Pelvic CT dated 3/19/2013.



TECHNIQUE: Real-time, grayscale, and color flow sonography of the pelvis is

performed both transabdominally and endovaginally. Images are reviewed in the

transverse and longitudinal planes.



FINDINGS:



Uterus: The retroverted uterus is normal in size and echotexture, measuring 9.2

x 4.7 x 5.8 cm. Small nabothian cysts are incidentally noted in the cervix.



Endometrium: The endometrium is normal in appearance, and the endometrial stripe

is normal in thickness measuring up to 0.7 cm.



Ovaries: The ovaries are normal in size and morphology. The right ovary measures

3.1 x 1.7 x 2.2 cm and the left ovary measures 2.9 x 1.5 x 2.1 cm. Small

follicles are present bilaterally. Normal Doppler waveforms are shown within

both ovaries.



Pelvis: There is no free fluid in the cul-de-sac. No concerning adnexal lesion

is seen.





IMPRESSION: No acute sonographic abnormality is identified in the pelvis.







Electronically signed by:  Oziel Sharma M.D.

1/9/2017 4:46 PM



Dictated Date/Time:  1/9/2017 4:45 PM

## 2017-01-09 NOTE — EMERGENCY ROOM VISIT NOTE
History


First contact with patient:  15:04


Chief Complaint:  ABDOMINAL PAIN


Stated Complaint:  ABD/ OVARY PAIN AND BURNING, PRESSURE, NAUSEA AND


Nursing Triage Summary:  


Pt reports sharp pain in abd. "A clump of blood clots came out of me. I have my 


period. I'm pretty sure my ovary burst. There is a family hx. The pain is so 

bad 


that I have started to disconnect from reality."





History of Present Illness


Patient is a 41-year-old white female with extensive past medical history 

including multiple psychiatric diagnoses, fibromyalgia, migraine disorder, 

chronic pain syndrome, polycystic ovarian syndrome, among others who presents 

emergency department for evaluation of pelvic pain.  Patient states that she 

started her menstrual cycle about 2-3 days ago.  She states that she woke up 

with left-sided pelvic pain this morning.  She states that she was doing "

Lamaze breathing" in bed to help work through the pain.    She got up to use 

the bathroom and states that she passed a large clot, roughly the "size of her 

fist."  She states that she felt a little dizzy, lightheaded and near syncopal 

after this, and laid back down.  She does have a history of orthostasis and is 

on prazosin.  She states that she was nauseous and had dry heaves throughout 

the day.  A couple of hours ago, she was riding in the car, when she noted pain 

starting on the right side.  She states that she "felt a cyst rupture" while 

she was in the car.  She now notes pain radiating across her entire lower 

abdomen into the epigastric area.  She did vomit 1.  She does have a history 

of PCOS an ovarian cyst.  She took her regular medications today, including 

tramadol, which did help with her pain slightly.  She rates her discomfort a 10/

10 presently.  She denies that she could be pregnant, but does report that she 

is sexually active.  She was was [facility] [address] [facility] recently and 

reports that she had a negative pregnancy test at that time.





Review of Systems


Review of systems as per HPI.  All other systems reviewed were negative.  10 

systems reviewed.





Past Medical/Surgical History


Medical Problems:


(1) Agoraphobia


(2) Anxiety


(3) Appendectomy


(4) Body Mass Index 33.0-33.9, Adult


(5) Borderline personality disorder


(6) Cannabis abuse


(7) Cholecystectomy


(8) Chronic post-traumatic stress disorder (PTSD)


(9) Depression


(10) Diarrhea


(11) Dysmetabolic Syndrome X


(12) Fibromyalgia


(13) SIERRA (generalized anxiety disorder)


(14) Headache


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


(16) Hx-Venous Thrombosis&Embolism


(17) Hypokalemia


(18) Major depressive disorder, recurrent severe without psychotic features


(19) Mood disorder


(20) Other Forms Migraine W/O Intractable Migraine


(21) Panic disorder with agoraphobia


(22) past psych meds


(23) Pleurisy


(24) Polycystic Ovaries


(25) PTSD (post-traumatic stress disorder)


Electronic medical records are reviewed and summarized as above/below.  See 

Problem List.  Patient was recently hospitalized on our inpatient psychiatric 

unit, and this admission was reviewed.





Family History





FH: HTN (hypertension)


FH: diabetes mellitus


Personality disorder





Social History


Smoking Status:  Never Smoker


Alcohol Use:  none


Drug Use:  none


Marital Status:  in relationship


Housing Status:  lives with family





Current/Historical Medications


Scheduled


Baclofen (Baclofen), 10 MG PO TID


Cholecalciferol (Vitamin D), 2,000 INTER.UNIT PO DAILY


Loperamide Hcl (Imodium), 2 TABS PO PRN


Magnesium Oxide (Magnesium-Oxide), 400 MG PO QAM


Multivitamin (Multivitamin), 1 TAB PO DAILY


Omeprazole (Prilosec), 20 MG PO DAILY


Prazosin HCl (Prazosin HCl), 1 MG PO HS


Ranitidine Hcl (Zantac), 150 MG PO DAILY





Scheduled PRN


Hydroxyzine HCl (Hydroxyzine HCl), 25 MG PO Q4H PRN for Anxiety


Ondansetron Hcl (Zofran), 4 MG PO UD PRN for Nausea


Tramadol Hcl (Ultram), 50 MG PO Q6H PRN for Pain





Allergies


Coded Allergies:  


     Clemons (Verified  Allergy, Severe, VOMITING,ITCHY TONGUE,FACE SWELLING, 1/9 /17)


     Macadamia Nut Oil (Verified  Allergy, Severe, RASH,VOMITING,ITCHY TONGUE,

FACE SWELLING, 1/9/17)


     Valproic Acid (Verified  Allergy, Severe, TONGUE SWELLING, 1/9/17)


     Amoxicillin (Verified  Allergy, Intermediate, BREAKOUT, 1/9/17)


     Ciprofloxacin (Verified  Allergy, Intermediate, WELTS/SWELLING, 1/9/17)


     Clavulanic Acid (Verified  Allergy, Intermediate, BREAKOUT, 1/9/17)


     Pineapple (Verified  Allergy, Intermediate, VOMITING, ITCHY TONGUE, 1/9/17)


     Iodine (Verified  Adverse Reaction, Intermediate, VOMITING, 1/9/17)


     Erythromycin (Verified  Adverse Reaction, Mild, NAUSEA, 1/9/17)


     Iodinated Contrast Media (Verified  Adverse Reaction, Mild, VOMITING, 1/9/ 17)





Physical Exam


Vital Signs











  Date Time  Temp Pulse Resp B/P Pulse Ox O2 Delivery O2 Flow Rate FiO2


 


1/9/17 17:56  63  131/72 100   


 


1/9/17 14:45 36.4 75 18 135/89 100 Room Air  











Physical Exam


CONSTITUTIONAL: Patient is an overweight, anxious, dramatic 41-year-old white 

female who was awake and alert and laying on the gurney in no acute distress, 

despite reporting "pain worse than childbirth," her vital signs are stable, she 

is breathing heavily and holding an emesis bag.


EYES:  Pupils equal, round, reactive to light and accommodation.  EOMs intact 

without nystagmus.  Sclera are anicteric. 


ENT:  Tympanic membranes intact, with normal landmarks.  External canals are 

clear.  Oral and nasopharynx are clear.  Mucous membranes are moist, no lesions

, tongue and gums appear normal.     


NECK: No bruits auscultated.  Supple without lymphadenopathy.  No thyromegaly.  

No meningeal signs.  Full active range of motion without discomfort.


CARDIOVASCULAR: Regular rate and rhythm, with normal S1 and S2, no murmur or 

gallop or rub is heard.  No carotid bruits auscultated.  No JVD.  Peripheral 

pulses easy to palpable.


RESPIRATORY: Breath sounds equal and clear to auscultation without wheezes, 

rales, or rhonchi heard.   Full and equal chest expansion without accessory 

muscle use or retractions. 


GI: Bowel sounds are present.  Multiple well-healed surgical scars are noted.  

Abdomen is soft, nondistended, no localized discomfort to palpation.  No 

guarding, rebound or rigidity.  No organomegaly.  No pulsatile masses.  


MUSCULOSKELETAL: Full range of motion of extremities x 4 with good strength.  

No cyanosis, edema, joint tenderness or swelling.  No deformity.


INTEGUMENTARY: No lesions or rash, normal skin turgor. 


NEUROLOGICAL: Alert, oriented, and cooperative.  Cranial nerves, sensation and 

strength grossly intact.  Pupils round, equal, and react to light, EOMs are 

full.


LYMPH: No lymphadenopathy.





Medical Decision & Procedures


ER Provider


Diagnostic Interpretation:


ULTRASOUND OF THE PELVIS 





CLINICAL HISTORY: Pelvic pain.





COMPARISON STUDY: Pelvic CT dated 3/19/2013.





TECHNIQUE: Real-time, grayscale, and color flow sonography of the pelvis is


performed both transabdominally and endovaginally. Images are reviewed in the


transverse and longitudinal planes.





FINDINGS:





Uterus: The retroverted uterus is normal in size and echotexture, measuring 9.2


x 4.7 x 5.8 cm. Small nabothian cysts are incidentally noted in the cervix.





Endometrium: The endometrium is normal in appearance, and the endometrial stripe


is normal in thickness measuring up to 0.7 cm.





Ovaries: The ovaries are normal in size and morphology. The right ovary measures


3.1 x 1.7 x 2.2 cm and the left ovary measures 2.9 x 1.5 x 2.1 cm. Small


follicles are present bilaterally. Normal Doppler waveforms are shown within


both ovaries.





Pelvis: There is no free fluid in the cul-de-sac. No concerning adnexal lesion


is seen.








IMPRESSION: No acute sonographic abnormality is identified in the pelvis.





Laboratory Results


1/9/17 15:00








Red Blood Count 4.56, Mean Corpuscular Volume 89.3, Mean Corpuscular Hemoglobin 

30.0, Mean Corpuscular Hemoglobin Concent 33.7, Mean Platelet Volume 10.7, 

Neutrophils (%) (Auto) 73.6, Lymphocytes (%) (Auto) 19.8, Monocytes (%) (Auto) 

5.8, Eosinophils (%) (Auto) 0.3, Basophils (%) (Auto) 0.3, Neutrophils # (Auto) 

6.88, Lymphocytes # (Auto) 1.85, Monocytes # (Auto) 0.54, Eosinophils # (Auto) 

0.03, Basophils # (Auto) 0.03





1/9/17 15:00

















Test


  1/9/17


15:00


 


White Blood Count


  9.35 K/uL


(4.8-10.8)


 


Red Blood Count


  4.56 M/uL


(4.2-5.4)


 


Hemoglobin


  13.7 g/dL


(12.0-16.0)


 


Hematocrit 40.7 % (37-47) 


 


Mean Corpuscular Volume


  89.3 fL


()


 


Mean Corpuscular Hemoglobin


  30.0 pg


(25-34)


 


Mean Corpuscular Hemoglobin


Concent 33.7 g/dl


(32-36)


 


Platelet Count


  228 K/uL


(130-400)


 


Mean Platelet Volume


  10.7 fL


(7.4-10.4)


 


Neutrophils (%) (Auto) 73.6 % 


 


Lymphocytes (%) (Auto) 19.8 % 


 


Monocytes (%) (Auto) 5.8 % 


 


Eosinophils (%) (Auto) 0.3 % 


 


Basophils (%) (Auto) 0.3 % 


 


Neutrophils # (Auto)


  6.88 K/uL


(1.4-6.5)


 


Lymphocytes # (Auto)


  1.85 K/uL


(1.2-3.4)


 


Monocytes # (Auto)


  0.54 K/uL


(0.11-0.59)


 


Eosinophils # (Auto)


  0.03 K/uL


(0-0.5)


 


Basophils # (Auto)


  0.03 K/uL


(0-0.2)


 


RDW Standard Deviation


  43.1 fL


(36.4-46.3)


 


RDW Coefficient of Variation


  13.3 %


(11.5-14.5)


 


Immature Granulocyte % (Auto) 0.2 % 


 


Immature Granulocyte # (Auto)


  0.02 K/uL


(0.00-0.02)


 


Urine Color RED 


 


Urine Appearance CLEAR (CLEAR) 


 


Urine pH 6.5 (4.5-7.5) 


 


Urine Specific Gravity


  1.008


(1.000-1.030)


 


Urine Protein NEG (NEG) 


 


Urine Glucose (UA) NEG (NEG) 


 


Urine Ketones NEG (NEG) 


 


Urine Occult Blood 3+ (NEG) 


 


Urine Nitrite NEG (NEG) 


 


Urine Bilirubin NEG (NEG) 


 


Urine Urobilinogen NEG (NEG) 


 


Urine Leukocyte Esterase TRACE (NEG) 


 


Urine WBC (Auto) 1-5 /hpf (0-5) 


 


Urine RBC (Auto) >30 /hpf (0-4) 


 


Urine Hyaline Casts (Auto) 0 /lpf (0-5) 


 


Urine Epithelial Cells (Auto) >30 /lpf (0-5) 


 


Urine Bacteria (Auto) NEG (NEG) 


 


Urine Pregnancy Test NEG (NEG) 


 


Anion Gap


  10.0 mmol/L


(3-11)


 


Estimated GFR (


American) 94.6 


 


 


Estimated GFR (Non-


American 81.6 


 


 


BUN/Creatinine Ratio 6.0 (10-20) 


 


Calcium Level


  8.7 mg/dl


(8.5-10.1)


 


Total Bilirubin


  0.7 mg/dl


(0.2-1)


 


Aspartate Amino Transf


(AST/SGOT) 13 U/L (15-37) 


 


 


Alanine Aminotransferase


(ALT/SGPT) 17 U/L (12-78) 


 


 


Alkaline Phosphatase


  68 U/L


()


 


Total Protein


  7.2 gm/dl


(6.4-8.2)


 


Albumin


  3.9 gm/dl


(3.4-5.0)


 


Globulin


  3.3 gm/dl


(2.5-4.0)


 


Albumin/Globulin Ratio 1.2 (0.9-2) 


 


Lipase


  129 U/L


()


 


Human Chorionic Gonadotropin,


Quant < 1 mIU/mL 


 











Medications Administered











 Medications


  (Trade)  Dose


 Ordered  Sig/Evelia


 Route  Start Time


 Stop Time Status Last Admin


Dose Admin


 


 Ketorolac


 Tromethamine


  (Toradol Inj)  30 mg  NOW  STAT


 IV  1/9/17 15:17


 1/9/17 15:24 DC 1/9/17 15:37


30 MG


 


 Ondansetron HCl 4


 mg  4 mg  NOW  STAT


 IV  1/9/17 15:17


 1/9/17 15:24 DC 1/9/17 15:36


4 MG


 


 Sodium Chloride


  (Nss 1000ml)  1,000 ml @ 


 999 mls/hr  Q1H1M STAT


 IV  1/9/17 15:17


 1/9/17 16:18 DC 1/9/17 15:36


999 MLS/HR











ED Course


The patient was seen and evaluated as above.  Her old records are reviewed, 

including her recent psychiatric hospitalization, at which point, her 

psychiatric providers did express concern regarding her chronic pain syndromes.

  Per their documentation, given her history of heroin addiction and high risk 

for abuse/overdosing, opioids were avoided during that hospitalization.





On examination, particularly once the patient was distracted and conversing, 

her abdomen was examined and was completely benign.  IV access was obtained and 

laboratory studies were collected.  She was medicated with Toradol 30 mg and 

Zofran 4 mg IV.  She was hydrated with normal saline solution.  She was ordered 

a heating pad, however it does not appear that she received this during her ED 

stay.  Laboratory studies were collected including CBC with differential, CMP, 

lipase, quantitative hCG and urinalysis.  Given her history of ovarian cyst, 

and her pelvic pain with associated menses, pelvic ultrasound was performed.





Patient's laboratory studies did not demonstrate any leukocytosis, anemia, 

electrolyte or liver function abnormalities.  Lipase is normal.  Her hCG level 

is less than 1.  Urinalysis demonstrates contamination from her menses with 3+ 

occult blood and greater than 30 RBCs and greater than 30 epithelial cells.  No 

bacteria, nitrates or white blood cells to suspect infection.





Pelvic ultrasound demonstrated an endometrial stripe measuring 0.7 cm and 

normal in thickness.  Ovaries were normal in size and morphology with small 

follicles present bilaterally.  No evidence for torsion.  There is no free 

pelvic fluid or concerning adnexal lesions.





The patient was reassessed.  All laboratory and diagnostic imaging studies were 

reviewed with her and her male friend.  She continued to report severe pain, 

but was observed laying on the gurney in no acute distress, playing on her cell 

phone.  She is hemodynamically stable, not demonstrating any evidence for 

hypotension, volume depletion or anemia.  She is not pregnant.  She does not 

have evidence for ruptured ovarian cyst or ovarian torsion.  Her symptoms may 

be related to her menses.  Differential diagnoses entertained included 

dysmenorrhea, ovarian cyst, ruptured ovarian cyst, ovarian torsion, pregnancy, 

ectopic pregnancy, UTI, pyelonephritis, cystitis, renal colic, among others.  

The patient was encouraged to use heat, continue her tramadol and use ibuprofen 

for discomfort.  She was advised to follow-up with her Colleg[facility] for further 

care and management.  She was educated on the worrisome signs or symptoms for 

which she should return to the emergency department.  The patient rated her 

discomfort a 0/10 at discharge.





Medical Decision


See ED Course.





Impression





 Primary Impression:  Pelvic pain


 Additional Impression:  Dysmenorrhea





Departure Information


Referrals


Keo Carr D.O. (PCP)





Patient Instructions


A Signature Page





Additional Instructions





DO NOT drive, drink alcohol, operate machinery, or perform dangerous activities 

today.  You were given medications in the ER that can affect your ability to 

safely function or operate a vehicle.





Ibuprofen(Motrin, Advil) may be used for fever or pain.  Use 800 mg 3 times 

daily with food.  Avoid using more than 2400mg in a 24 hour period.  Do not use 

2400mg per day for more than three consecutive days without physician 

direction.  Prolonged inappropriate use can lead to stomach upset or ulcers.  

This is available over the counter and typically comes in 200mg tablets.  





Acetaminophen(Tylenol) may be used for fever or pain.  Use 1000mg every eight 

hours as needed.  Avoid using more than 3000mg in a 24 hour period.  This is 

available over the counter.





Rest and drink plenty of fluids as tolerated.  Slow sips of water or sports 

drinks are recommended instead of large amounts all at once.  





Continue current medications.





Once your stomach is settled start with a clear liquid diet (jello, soup broth, 

etc.) and then advance as tolerated.  You should avoid full, heavy meals for 

about 24 hrs from the time your symptoms resolved.  





Return to the ER immediately for worsening or persistent abdominal pain, 

vomiting, fevers, chest pains, difficulty breathing, heavier bleeding, 

worsening of your condition, or as needed.





Follow up with your gynecologist recheck of your current condition.

## 2017-02-21 NOTE — PROGRESS NOTE
Internal Med Progress Note


Date of Service:


Feb 21, 2017.


Provider Documentation:





SUBJECTIVE:





Patient is c/o left sided numbness and weakness, but was able to eat her lunch 

using her hands per RN.


Talking funny, not sure if would call this slurred speech.


Headaches +, No nausea, vomiting, fever, chills.








OBJECTIVE:





Vital Signs-as noted below





Exam:


GENERAL:  Obese, anxious


HEENT:  Mild facial asymmetry on the left, tongue protruding to the left side. 


NECK:  Supple.


CHEST:  Clear to auscultation.


HEART:  Regular rate and rhythm.


ABDOMEN:  Soft.


EXTREMITIES:  No edema, no tenderness.


NEUROLOGIC: Mild Lt facial asymmetry, tongue deviated to the left. Neurological

- AAOX3, Power- 3/5 left side, 5/5 all extremities, C/o decreased sensation on 

left side, Reflexes- normal





Lab data as noted below.


ASSESSMENT & PLAN:





ASSESSMENT AND PLAN :





HEADACHE/LEFT SIDED WEAKNESS/DECREASED SENSATION


-Rule out stroke vs complicated migraine


-Unclear if true weakness/slurred speech, as was able to use extremities when 

no one was watching her while having her lunch.


Varying intensity of speech issue.


-Work up- CT head- neg, MRI brain, MRA head- ordered


- mg daily ordered





MIGRAINE


Hx of migraines. Received botox injection recently but says headaches worsened 

after that


-Neurology on board





? CHEST PAIN


Did not mention to me about this.


-Hx of DVT s/p anticoagulation, D dimer 920, but no signs of hypoxia/Tachycardia


-Will order US duplex





PTSD/Mood disorder/Fibromyalgia/Hx of benzo abuse


-Multiple issues, probably contributing to current symptoms, however, will rule 

out pathological issues


-Psychiatry consulted





PT OT evaluation





DVT prophylaxis with Lovenox subQ.  





Full code.


 


DISPOSITION


PT/OT ordered


To be determined


Vital Signs:











  Date Time  Temp Pulse Resp B/P Pulse Ox O2 Delivery O2 Flow Rate FiO2


 


2/21/17 12:29     95 Room Air  


 


2/21/17 12:00 36.9 88 18 121/81 96 Room Air  


 


2/21/17 08:05  71 16 126/70 95 Room Air  


 


2/21/17 07:26  66      


 


2/21/17 05:38  77 19  96   


 


2/21/17 05:28    121/85    


 


2/21/17 05:11  101      


 


2/21/17 05:08  74 24  95   


 


2/21/17 04:58    108/62    


 


2/21/17 04:38  101 21  97   


 


2/21/17 04:37  82 16 127/79 98 Room Air  


 


2/21/17 04:34    127/79    


 


2/21/17 04:08  102 23  97   


 


2/21/17 03:58    121/73    


 


2/21/17 03:38  79 22  93   


 


2/21/17 03:28    100/66    


 


2/21/17 03:08  80 29  92   


 


2/21/17 03:03  80 23  93   


 


2/21/17 02:58    92/67    


 


2/21/17 02:33  71   94   


 


2/21/17 02:29    132/77    


 


2/21/17 02:03  81 17  96   


 


2/21/17 01:58    125/85    


 


2/21/17 01:44  87      


 


2/21/17 01:35    129/91    


 


2/21/17 01:34     98 Room Air  


 


2/21/17 01:34 37.4 84 20 129/91 98 Room Air  








Lab Results:





Results Past 24 Hours








Test


  2/21/17


01:11 2/21/17


06:55 2/21/17


12:55 Range/Units


 


 


White Blood Count 8.97   4.8-10.8  K/uL


 


Red Blood Count 4.66   4.2-5.4  M/uL


 


Hemoglobin 13.8   12.0-16.0  g/dL


 


Hematocrit 40.6   37-47  %


 


Mean Corpuscular Volume 87.1     fL


 


Mean Corpuscular Hemoglobin 29.6   25-34  pg


 


Mean Corpuscular Hemoglobin


Concent 34.0


  


  


  32-36  g/dl


 


 


Platelet Count 247   130-400  K/uL


 


Mean Platelet Volume 10.2   7.4-10.4  fL


 


Neutrophils (%) (Auto) 71.1    %


 


Lymphocytes (%) (Auto) 20.8    %


 


Monocytes (%) (Auto) 7.2    %


 


Eosinophils (%) (Auto) 0.4    %


 


Basophils (%) (Auto) 0.3    %


 


Neutrophils # (Auto) 6.36   1.4-6.5  K/uL


 


Lymphocytes # (Auto) 1.87   1.2-3.4  K/uL


 


Monocytes # (Auto) 0.65   0.11-0.59  K/uL


 


Eosinophils # (Auto) 0.04   0-0.5  K/uL


 


Basophils # (Auto) 0.03   0-0.2  K/uL


 


RDW Standard Deviation 43.2   36.4-46.3  fL


 


RDW Coefficient of Variation 13.7   11.5-14.5  %


 


Immature Granulocyte % (Auto) 0.2    %


 


Immature Granulocyte # (Auto) 0.02   0.00-0.02  K/uL


 


D-Dimer 920   0-500  ug/L FEU


 


Sodium Level 141   136-145  mmol/L


 


Potassium Level 3.8   3.5-5.1  mmol/L


 


Chloride Level 105     mmol/L


 


Carbon Dioxide Level 24   21-32  mmol/L


 


Anion Gap 12.0   3-11  mmol/L


 


Blood Urea Nitrogen 10   7-18  mg/dl


 


Creatinine


  1.00


  


  


  0.60-1.20


mg/dl


 


Est Creatinine Clear Calc


Drug Dose 90.6


  


  


   ml/min


 


 


Estimated GFR (


American) 81.0


  


  


   


 


 


Estimated GFR (Non-


American 69.9


  


  


   


 


 


BUN/Creatinine Ratio 9.9   10-20  


 


Random Glucose 102   70-99  mg/dl


 


Calcium Level 8.7   8.5-10.1  mg/dl


 


Total Bilirubin 0.7   0.2-1  mg/dl


 


Aspartate Amino Transf


(AST/SGOT) 17


  


  


  15-37  U/L


 


 


Alanine Aminotransferase


(ALT/SGPT) 26


  


  


  12-78  U/L


 


 


Alkaline Phosphatase 70     U/L


 


Total Protein 7.7   6.4-8.2  gm/dl


 


Albumin 4.0   3.4-5.0  gm/dl


 


Globulin 3.7   2.5-4.0  gm/dl


 


Albumin/Globulin Ratio 1.1   0.9-2  


 


Thyroid Stimulating Hormone


(TSH) 2.370


  


  


  0.300-4.500


uIu/ml


 


Chemistry Specimen Hemolysis     


 


Troponin I  < 0.015 < 0.015 0-0.045  ng/ml


 


Triglycerides Level  51  0-150  mg/dl


 


Cholesterol Level  160  0-200  mg/dl


 


HDL Cholesterol  37   mg/dl


 


LDL Cholesterol, Calculated  113   mg/dl


 


VLDL Cholesterol, Calculated  10   mg/dl


 


Cholesterol/HDL Ratio  4.3   


 


Lyme Disease IgM Antibody  NEG  NEG

## 2017-02-21 NOTE — ECHOCARDIOGRAM REPORT
*NOTICE TO RECEIVING PARTY AGENCY**  This information is strictly Confidential and 
protected under [place] law.  [place] law prohibits you from making any further 
disclosure of this information unless further disclosure is expressly permitted by the 
written consent of the person to whom it pertains or is authorized by law.  A general 
authorization for the release of medical or other information is not sufficient for this 
purpose.  Hospital accepts no responsibility if the information is made available to any 
other person, INCLUDING THE PATIENT.



Interpretation Summary

  *  Name: JOHNATHAN AMADO  Study Date: 2017 09:44 AM  BP: 126/70 mmHg

  *  MRN: [id number]  Patient Location: C.EDINP\S\EDINP 1\S\4  HR: 88

  *  : 1975 (M/d/yyyy)  Gender: Female  Height: 68 in

  *  Age: 41 yrs  Ethnicity: [place]  Weight: 216 lb

  *  Ordering Physician: Conrado Hauser

  *  Referring Physician: Self, Referred

  *  Performed By: Rona Varghese RCS

  *  Accession# [id number]  Account# [id number]

  *  Reason For Study: CHEST PAIN

  *  BSA: 2.1 m2

  *  -- Conclusions --

  *  The left ventricular wall motion is normal.

  *  There is mild concentric left ventricular hypertrophy.

  *  Left ventricular systolic function is normal.

  *  The LV Ejection Fraction = 60-65%.

  *  The LV diastolic function is normal.

  *  There is no pericardial effusion.

Procedure Details

  *  A complete two-dimensional transthoracic echocardiogram was performed (2D, M-mode, 
Doppler and color flow Doppler).

Left Ventricle

  *  The left ventricle is normal in size.

  *  There is mild concentric left ventricular hypertrophy.

  *  Left ventricular systolic function is normal.

  *  Ejection Fraction = 60-65%.

  *  The left ventricular wall motion is normal.

Right Ventricle

  *  The right ventricle is normal size.

  *  The right ventricular systolic function is normal as assessed by tricuspid annular 
plane systolic excursion (TAPSE) (normal >1.5 cm).

Atria

  *  The left atrial size is normal.

  *  Right atrial size is normal.

  *  There is no evidence of atrial septal defect, but resolution does not allow 
assessment for a patent foramen ovale.

Mitral Valve

  *  The mitral valve is normal.

  *  There is no mitral valve stenosis.

  *  Significant mitral regurgitation is absent.

Tricuspid Valve

  *  The tricuspid valve is normal.

  *  There is no tricuspid stenosis.

  *  Significant tricuspid regurgitation is absent.

Aortic Valve

  *  The aortic valve is trileaflet.

  *  Aortic stenosis is absent.

  *  There is no significant aortic regurgitation.

Pulmonic Valve

  *  The pulmonary valve is not well seen, but the Doppler examination is normal without 
significant regurgitation or stenosis.

Great Vessels

  *  The aortic root and proximal ascending aorta are normal sized.

Pericardium/Pleural

  *  There is no pericardial effusion.

Great Vessels

  *  Normal inferior vena cava diameter and respiratory variation suggests normal central 
venous pressure.

Left Ventricular Diastolic Function

  *  The LV diastolic function is normal.



MMode 2D Measurements and Calculations

IVSd 1.2 cm

IVSs 1.5 cm



LVIDd 5.0 cm

LVIDs 3.6 cm

LVPWd 1.0 cm

LVPWs 1.5 cm



IVS/LVPW 1.2 

FS 28.6 %

EDV(Teich) 119.5 ml

ESV(Teich) 53.9 ml

EF(Teich) 54.9 %



EDV(cubed) 126.8 ml

ESV(cubed) 46.1 ml

EF(cubed) 63.6 %

% IVS thick 23.1 %

% LVPW thick 49.4 %





LV mass(C)d 217.5 grams

LV mass(C)dI 103.0 grams/m\S\2

LV mass(C)s 206.8 grams

LV mass(C)sI 97.9 grams/m\S\2



SV(Teich) 65.6 ml

SI(Teich) 31.1 ml/m\S\2

SV(cubed) 80.7 ml

SI(cubed) 38.2 ml/m\S\2



Ao root diam 3.3 cm

Ao root area 8.4 cm\S\2

LA dimension 3.7 cm



LA/Ao 1.1 

LVOT diam 1.9 cm

LVOT area 2.9 cm\S\2





LVAd ap4 27.1 cm\S\2

LVLd ap4 6.4 cm

EDV(MOD-sp4) 95.1 ml

EDV(sp4-el) 97.2 ml

LVAs ap4 11.9 cm\S\2

LVLs ap4 4.6 cm

ESV(MOD-sp4) 25.6 ml

ESV(sp4-el) 26.2 ml

EF(MOD-sp4) 73.1 %

EF(sp4-el) 73.1 %



LVAd ap2 22.3 cm\S\2

LVLd ap2 5.9 cm

EDV(MOD-sp2) 68.6 ml

EDV(sp2-el) 71.2 ml

LVAs ap2 10.5 cm\S\2

LVLs ap2 4.6 cm

ESV(MOD-sp2) 20.7 ml

ESV(sp2-el) 20.3 ml

EF(MOD-sp2) 69.8 %

EF(sp2-el) 71.5 %



LVLd %diff -7.97 %

EDV(MOD-bp) 83.7 ml

LVLs %diff 0.13 %

ESV(MOD-bp) 23.0 ml

EF(MOD-bp) 72.6 %



SV(MOD-sp4) 69.5 ml

SI(MOD-sp4) 32.9 ml/m\S\2





SV(MOD-sp2) 47.9 ml

SI(MOD-sp2) 22.7 ml/m\S\2



SV(MOD-bp) 60.8 ml

SI(MOD-bp) 28.8 ml/m\S\2



SV(sp4-el) 71.1 ml

SI(sp4-el) 33.7 ml/m\S\2



SV(sp2-el) 50.9 ml

SI(sp2-el) 24.1 ml/m\S\2







Doppler Measurements and Calculations

MV E max riley 55.3 cm/sec

MV A max riley 81.0 cm/sec



MV E/A 0.68 



MV P1/2t max riley 86.9 cm/sec

MV P1/2t 66.5 msec

MVA(P1/2t) 3.3 cm\S\2

MV dec slope 382.5 cm/sec\S\2

MV dec time 0.26 sec



Ao V2 max 119.4 cm/sec

Ao max PG 5.7 mmHg

Ao max PG (full) 2.5 mmHg

YESICA(V,A) 2.2 cm\S\2

YESICA(V,D) 2.2 cm\S\2





LV V1 max PG 3.2 mmHg



LV V1 max 89.2 cm/sec



PA V2 max 91.7 cm/sec

PA max PG 3.4 mmHg

## 2017-02-21 NOTE — EMERGENCY ROOM VISIT NOTE
History


Report prepared by Meron:  Kaila Callaway


Under the Supervision of:  Dr. Conchis Cavazos D.O.


First contact with patient:  01:39


Chief Complaint:  NEURO SYMPTOMS


Stated Complaint:  NEURO SYMPTOMS





History of Present Illness


The patient is a 41 year old female who presents to the Emergency Room with 

complaints of persistent neuro symptoms that began prior to arrival.  The 

patient states that she had Botox on the 2nd of February.  She states that 

today she has noticed swelling to her legs, muscle tightness, weakness, and 

difficulty speaking.  She states that she has a history of  headaches that have 

caused similar symptoms in the past.  The patient states that she lost the 

ability to speak for two months due to her previous headaches.  She states that 

she follows with Dr. Vargas, Neurology.  The patient states that she has been 

evaluated in the hospital in the past for her headaches.  She notes left lower 

abdominal pain today.  The patient states that she takes Baclofen and Ultram 

for her migraines, noting that she took both medications today.  The patient 

additionally notes lip numbness and tongue numbness today.  The patient 

explains that she has diffuse body pain.  She also describes persistent 

weakness in her left arm and left leg which is also happened to her in the past 

with her migraines.





   Source of History:  patient


   Onset:  prior to arrival


   Position:  other (global)


   Quality:  other (neuro symptoms)


   Timing:  other (persistent)


   Associated Symptoms:  + numbness, + weakness


Note:


Associated Symptoms: muscle tightness, swelling to legs, difficulty speaking.





Review of Systems


See HPI for pertinent positives & negatives. A total of 10 systems reviewed and 

were otherwise negative.





Past Medical & Surgical


Medical Problems:


(1) Agoraphobia


(2) Anxiety


(3) Appendectomy


(4) Body Mass Index 33.0-33.9, Adult


(5) Borderline personality disorder


(6) Cannabis abuse


(7) Cholecystectomy


(8) Chronic post-traumatic stress disorder (PTSD)


(9) Depression


(10) Diarrhea


(11) Dysmetabolic Syndrome X


(12) Fibromyalgia


(13) SIERRA (generalized anxiety disorder)


(14) Headache


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


(16) Hx-Venous Thrombosis&Embolism


(17) Hypokalemia


(18) Major depressive disorder, recurrent severe without psychotic features


(19) Mood disorder


(20) Other Forms Migraine W/O Intractable Migraine


(21) Panic disorder with agoraphobia


(22) past psych meds


(23) Pleurisy


(24) Polycystic Ovaries


(25) PTSD (post-traumatic stress disorder)








Family History





FH: HTN (hypertension)


FH: diabetes mellitus


Personality disorder





Social History


Smoking Status:  Never Smoker


Alcohol Use:  none


Drug Use:  none


Marital Status:  in relationship


Housing Status:  lives with family





Current/Historical Medications


Scheduled


Baclofen (Baclofen), 10 MG PO TID


Cholecalciferol (Vitamin D), 2,000 INTER.UNIT PO DAILY


Hydroxyzine Hcl (Atarax), 50 MG PO HS


Loperamide Hcl (Imodium), 2 TABS PO PRN


Magnesium Oxide (Magnesium-Oxide), 400 MG PO QAM


Multivitamin (Multivitamin), 1 TAB PO DAILY


Omeprazole (Prilosec), 20 MG PO DAILY


Prazosin HCl (Prazosin HCl), 1 MG PO HS





Scheduled PRN


Ondansetron Hcl (Zofran), 4 MG PO UD PRN for Nausea


Tramadol Hcl (Ultram), 50 MG PO Q6H PRN for Pain





Allergies


Coded Allergies:  


     Eckerman (Verified  Allergy, Severe, VOMITING,ITCHY TONGUE,FACE SWELLING, 2/ 21/17)


     Macadamia Nut Oil (Verified  Allergy, Severe, RASH,VOMITING,ITCHY TONGUE,

FACE SWELLING, 2/21/17)


     Valproic Acid (Verified  Allergy, Severe, TONGUE SWELLING, 2/21/17)


     Amoxicillin (Verified  Allergy, Intermediate, BREAKOUT, 2/21/17)


     Ciprofloxacin (Verified  Allergy, Intermediate, WELTS/SWELLING, 2/21/17)


     Clavulanic Acid (Verified  Allergy, Intermediate, BREAKOUT, 2/21/17)


     Pineapple (Verified  Allergy, Intermediate, VOMITING, ITCHY TONGUE, 2/21/17

)


     Iodine (Verified  Adverse Reaction, Intermediate, VOMITING, 2/21/17)


     Erythromycin (Verified  Adverse Reaction, Mild, NAUSEA, 2/21/17)


     Iodinated Contrast Media (Verified  Adverse Reaction, Mild, VOMITING, 2/21/ 17)





Physical Exam


Vital Signs











  Date Time  Temp Pulse Resp B/P Pulse Ox O2 Delivery O2 Flow Rate FiO2


 


2/21/17 05:38  77 19  96   


 


2/21/17 05:28    121/85    


 


2/21/17 05:11  101      


 


2/21/17 05:08  74 24  95   


 


2/21/17 04:58    108/62    


 


2/21/17 04:38  101 21  97   


 


2/21/17 04:37  82 16 127/79 98 Room Air  


 


2/21/17 04:34    127/79    


 


2/21/17 04:08  102 23  97   


 


2/21/17 03:58    121/73    


 


2/21/17 03:38  79 22  93   


 


2/21/17 03:28    100/66    


 


2/21/17 03:08  80 29  92   


 


2/21/17 03:03  80 23  93   


 


2/21/17 02:58    92/67    


 


2/21/17 02:33  71   94   


 


2/21/17 02:29    132/77    


 


2/21/17 02:03  81 17  96   


 


2/21/17 01:58    125/85    


 


2/21/17 01:44  87      


 


2/21/17 01:35    129/91    


 


2/21/17 01:34     98 Room Air  


 


2/21/17 01:34 37.4 84 20 129/91 98 Room Air  











Physical Exam


HEENT: Head - normocephalic and atraumatic.  Pupils are equal, round, and 

reactive to light.  Extraocular eye muscles are intact and sclera are 

anicteric.  Ears -  bilaterally patent canals with noninjected tympanic 

membranes and no evidence of hemotympanum.  Nose -  moist nasal mucosa without 

discharge. Mouth - moist buccal mucosa.  Oropharynx is nonerythematous and 

there is no tonsillar exudate or edema noted.


Neck: Supple; no JVD, nuchal rigidity, cervical lymphadenopathy.


Heart: Regular rate and rhythm.  There is a normal S1 and S2 with no murmurs, 

clicks, or gallops appreciated.


Lungs: Clear to auscultation bilaterally with no wheezes, rales, or rhonchi.


Abdomen: Soft, completely nontender, nondistended, with good bowel sounds.  

There are no palpable pulsatile masses or hepatosplenomegaly.  There is no 

guarding, rigidity, or rebound noted.


Extremities: No evidence of cyanosis, clubbing, or edema.  There are easily 

palpable peripheral pulses.


Neuro:The patient is awake and alert, oriented to day, time, and place.  

Gargled speech, pain with movement of left arm and leg and some weakness in 

that arm and leg.   There are no cerebellar signs.





Medical Decision & Procedures


ER Provider


Diagnostic Interpretation:


CT results as stated below per my review and radiologist interpretation: 





CT Head: 





Comparison: 2/12/2016





No evidence of large territorial infarct, hemorrhage, mass or edema.





The sinuses are patent.





No acute osseous abnormality.





Radiologist: Rajat Raymond MD         Phone: [phone]





Study ready at 0436 and initial results transmitted 1025.





Laboratory Results


2/21/17 01:11








Red Blood Count 4.66, Mean Corpuscular Volume 87.1, Mean Corpuscular Hemoglobin 

29.6, Mean Corpuscular Hemoglobin Concent 34.0, Mean Platelet Volume 10.2, 

Neutrophils (%) (Auto) 71.1, Lymphocytes (%) (Auto) 20.8, Monocytes (%) (Auto) 

7.2, Eosinophils (%) (Auto) 0.4, Basophils (%) (Auto) 0.3, Neutrophils # (Auto) 

6.36, Lymphocytes # (Auto) 1.87, Monocytes # (Auto) 0.65, Eosinophils # (Auto) 

0.04, Basophils # (Auto) 0.03





2/21/17 01:11

















Test


  2/21/17


01:11 2/21/17


05:53


 


White Blood Count


  8.97 K/uL


(4.8-10.8) 


 


 


Red Blood Count


  4.66 M/uL


(4.2-5.4) 


 


 


Hemoglobin


  13.8 g/dL


(12.0-16.0) 


 


 


Hematocrit 40.6 % (37-47)  


 


Mean Corpuscular Volume


  87.1 fL


() 


 


 


Mean Corpuscular Hemoglobin


  29.6 pg


(25-34) 


 


 


Mean Corpuscular Hemoglobin


Concent 34.0 g/dl


(32-36) 


 


 


Platelet Count


  247 K/uL


(130-400) 


 


 


Mean Platelet Volume


  10.2 fL


(7.4-10.4) 


 


 


Neutrophils (%) (Auto) 71.1 %  


 


Lymphocytes (%) (Auto) 20.8 %  


 


Monocytes (%) (Auto) 7.2 %  


 


Eosinophils (%) (Auto) 0.4 %  


 


Basophils (%) (Auto) 0.3 %  


 


Neutrophils # (Auto)


  6.36 K/uL


(1.4-6.5) 


 


 


Lymphocytes # (Auto)


  1.87 K/uL


(1.2-3.4) 


 


 


Monocytes # (Auto)


  0.65 K/uL


(0.11-0.59) 


 


 


Eosinophils # (Auto)


  0.04 K/uL


(0-0.5) 


 


 


Basophils # (Auto)


  0.03 K/uL


(0-0.2) 


 


 


RDW Standard Deviation


  43.2 fL


(36.4-46.3) 


 


 


RDW Coefficient of Variation


  13.7 %


(11.5-14.5) 


 


 


Immature Granulocyte % (Auto) 0.2 %  


 


Immature Granulocyte # (Auto)


  0.02 K/uL


(0.00-0.02) 


 


 


Anion Gap


  12.0 mmol/L


(3-11) 


 


 


Est Creatinine Clear Calc


Drug Dose 90.6 ml/min 


  


 


 


Estimated GFR (


American) 81.0 


  


 


 


Estimated GFR (Non-


American 69.9 


  


 


 


BUN/Creatinine Ratio 9.9 (10-20)  


 


Calcium Level


  8.7 mg/dl


(8.5-10.1) 


 


 


Total Bilirubin


  0.7 mg/dl


(0.2-1) 


 


 


Aspartate Amino Transf


(AST/SGOT) 17 U/L (15-37) 


  


 


 


Alanine Aminotransferase


(ALT/SGPT) 26 U/L (12-78) 


  


 


 


Alkaline Phosphatase


  70 U/L


() 


 


 


Total Protein


  7.7 gm/dl


(6.4-8.2) 


 


 


Albumin


  4.0 gm/dl


(3.4-5.0) 


 


 


Globulin


  3.7 gm/dl


(2.5-4.0) 


 


 


Albumin/Globulin Ratio 1.1 (0.9-2)  


 


Chemistry Specimen Hemolysis   








Laboratory results per my review.





Medications Administered











 Medications


  (Trade)  Dose


 Ordered  Sig/Evelia


 Route  Start Time


 Stop Time Status Last Admin


Dose Admin


 


 Lorazepam


  (Ativan Inj)  2 mg  STK-MED ONCE


 .ROUTE  2/21/17 01:45


 2/21/17 01:47 DC 2/21/17 01:52


1 MG











Procedure


The patient was treated with 1 mg of Ativan Inj.





ED Course


0145: Past medical records reviewed. The patient was evaluated in room B4B. A 

complete history and physical exam was performed.  During the examination, the 

patient's speech has improved while I spoke to her.





0145:  Ordered Ativan 1 mg IV.  Again, I questioned the patient about her 

symptoms.  She states that she has had this garbled speech and extremity 

weakness in the past associated with her headaches.





0230: I reevaluated the patient and she states that she is having diffuse body 

pain.  I offered her something for her pain and she declined.





0315: I reevaluated the patient and she is sound asleep and hemodynamically 

stable.





0408: I reevaluated the patient and she was asleep.  I woke the patient from 

sleep and she states that her pain is so severe, but continues to decline pain 

medications.  The patient is insistent that Dr. Vargas knows her well and 

typically would recommend admission to the hospital for these episodes.  She 

went on to explain that her symptoms are typically caused by the bones in her 

neck that shift, causing a lack of blood flow to the brain.  The patient notes 

that her left arm and left leg are so weak.  She states that she has been 

seeing a team of doctors since she was 9 years old that have much more 

experience with her medical history.  The patient additionally notes concern 

for blood clots, stating that she had a previous DVT, but denies any 

anticoagulation therapy.  Dr. Vargas will be consulted.  I additionally did a 

cranial nerve evaluation at this time that was normal.  She continues to 

exhibit weakness in the left arm and left leg   With loss of sensation in that 

left arm and left leg.





0423: I discussed the patient's case with Dr. Vargas, Neurology.  He states 

that he has no recollection of the patient.  He recommends a CT scan and 

admission for her hemiplegic migraine.  He also suggested considering steroid 

therapy.





0524: I discussed the patients case with Dorene Loomis.  He is going 

to evaluate the patient for further treatment.





Medical Decision


The patient is a 41 year old female who presents to the ED with neuro symptoms. 

Differential diagnosis includes migraine variant, exacerbation of chronic 

muscle pain and spasm, stroke, hemiplegic migraine.





Labs: no leukocytosis stable H&H, normal renal function and glucose, normal 

LFTs.





The patient has a long-standing history of chronic pain for which she follows 

with pain management.  At this time, she is describing diffuse pain but 

declines wanting anything for her pain.  The patient presents to ER with 

stiffening of her arms and legs, garbled speech, and left arm and leg weakness.

  The patient admits that none of the symptoms are brand-new for her but She 

states that she has not had an episode like this for the past 3 years.





The patient received Ativan here in the emergency department which gave her 

significant muscle relaxation.  She did sleep for a period of time here in the 

emergency department.  On reevaluation, however, she still had weakness in her 

left arm and leg.  She does have a history of hemiplegic migraines.  This seems 

to be the cause again this time.





The patient had a CT scan of the brain which was unremarkable.





I discussed the case with the [facility] Hospitalist and they will evaluate for 

further management.





Consults


Time Called:  0417


Consulting Physician:  Dr. Vargas, Neurology


Returned Call:  [phone]


I discussed the patient's case with Dr. Vargas, Neurology.  He states that he 

has no recollection of the patient.  He recommends a CT scan and admission for 

her hemiplegic migraine.


Additional Consults:  


   Time Called:  0521


   Consulted Physician:  Dorene Loomis


   Returned Call:  [phone]


Additional Comments:


I discussed the patients case with Dorene Loomis.  He is going to 

evaluate the patient for further treatment.





Impression





 Primary Impression:  


 Hemiplegic migraine





Scribe Attestation


The scribe's documentation has been prepared under my direction and personally 

reviewed by me in its entirety. I confirm that the note above accurately 

reflects all work, treatment, procedures, and medical decision making performed 

by me.





Departure Information


Dispostion


Being Evaluated By Hospitalist





Referrals


eKo Carr D.O. (PCP)





Patient Instructions


[facility]

## 2017-02-21 NOTE — HISTORY & PHYSICAL EXAMINATION
DATE OF ADMISSION:  

 

**NOTICE TO RECEIVING PARTY/AGENCY**

 

This information is strictly Confidential and protected under [place]

law.  [place] law prohibits you from making any further disclosure of

this information unless further disclosure is expressly permitted by the

written consent of the person to whom it pertains or is authorized by law.  A

general authorization for the release of medical or other information is not

sufficient for this purpose.  Hospital accepts no responsibility if the

information is made available to any other person, INCLUDING THE PATIENT.

 

PRIMARY CARE PHYSICIAN:  Dr. Carr.

 

Hx obtained form px and records.



CHIEF COMPLAINT:  worsening headache; weakness on the left side.

 

HISTORY OF PRESENT ILLNESS:  



Medical history significant for chronic migraine, mood disorder,

history of mini-strokes as per patient, DVT status post

anticoagulation, hx seizures as per px.

History of fibromyalgia as per records.



Recent confinement at the [facility] [place] last December 2016 for depression.  

Patient was seen by Neurology for chronic headache

symptoms during confinement.

No further imaging needed.  Full workup outpx for headaches. 



About 2 weeks ago patient had Botox injection at outpatient 

[facility] Pain management for migraine.  

Migraine headache worse after symptoms.  

Px admits to being very depressed because of intractable migraine.  

Denies suicidal ideation.  



A few days later, she noted that she noted decreased hearing sx.

"I feel like I'm in a tunnel."

Intermittent blurred vision, both eyes.  

Last night, while showering, she noted left-sided weakness, arm, and leg.

Her L face, mouth and tongue felt funny.  

Px think she's had a previous episode in the past but not this bad.



Patient had some fleeting chest pain, shortness of breath.  

No cough.  



Patient was brought to Emergency Room.  



Patient stopped aspirin 2 years ago as per family doctor advice as per px 

because he wanted her to be in a minimal number of pills.  



MEDICAL HISTORY:  As above.  



SURGERIES:  Appendectomy, cholecystectomy.

 

HOME MEDICATIONS:  Tramadol, vitamin D, Atarax, Imodium, multivitamins,

Prilosec, Zofran.

 

ALLERGIES:  ALMOND, AMOXICILLIN, ERYTHROMYCIN, IODINE, CLAVULANIC ACID,

CIPROFLOXACIN, MACADAMIA NUTS, PINEAPPLE, DYE.  

 

FAMILY HISTORY:  Family history of heart disorder, diabetes.

 

PERSONAL SOCIAL HISTORY:  Nonsmoker, no chronic intake of alcoholic

beverages.  Disabled.

 

REVIEW OF SYSTEMS:  As per HPI. all other ROS negative

 

PHYSICAL EXAMINATION:

VITAL SIGNS:  Blood pressure was noted to be 110/80, pulse rate 84, RR 20,

temperature 37, O2 sats 98 on room air.

GENERAL:  Noted to be obese, anxious, no respiratory distress, dysarthria

HEENT:  mild facial asymmetry on the left, tongue protruding to the left side. 

NECK:  Supple.

CHEST:  Clear to auscultation.

HEART:  Regular rate and rhythm.

ABDOMEN:  Soft.

EXTREMITIES:  No edema, no tenderness.

NEUROLOGIC: mild L facial asymmetry, tongue deviated to the left.

equal palatal elev, gag reflex noted.



MMT of 3/5 on the left upper and lower extremities.

 

LABS:  Hemoglobin 13, hematocrit 40, white cells 8.9.  Sodium 141, potassium

3.8, chloride 105, CO2 24, BUN 10, creatinine 1, glucose 100.  



Troponin is pending.  EKG pending.



CT of the head, no acute pathology.  





ASSESSMENT:

1.  Left-sided weakness, headache

? acute stroke (hx TIAs as per px) versus complicated migraine.

Hx Botox injection.

2.  Chest pain, possibly from anxiety

rule out pulmonary embolism.

History of DVT status post anticoagulation.

3.  Mood disorder.  Patient claims to be very depressed from intractable 
migraine.

4.  Post-traumatic stress disorder as per records.

5.  hx fibromyalgia as per records

6.  hx benzo abuse as per records

   

PLAN: 



Observation 

PCU, neuro checks

ASA for stroke prevention until stroke ruled out by MRI/MRA of brain.  

Decadron x 1 dose for poss complicated migraine. 

Neuro consult RE intractable headache, L sided weakness

ER MD already in touch with Dr. Vargas.  

check EKG, troponin, ddimer, 2-D echo for chest pain.

PE study if D-dimer abnormal.  

Psych consult depression.  

PT OT eval

DVT prophylaxis with Lovenox subQ.  

Full code.

 

 

 

MTDD

## 2017-02-21 NOTE — NEUROLOGY CONSULTATION
Neurology Consultation


Date of Consultation:


Feb 21, 2017.


Attending Physician:


Anthony Teresa S


Primary Care Physician:


Keo Carr D.O.


Reason for Consultation:


headache, L sided weakness


History of Present Illness


Source:  patient


Sussy is a 41 year old female who has a PMH PTSD, fibromyalgia, mood disorder, 

anxiety ,borderline personality disorder, migraines. She states that about 15 

days ago she had a botox injection which she states made her headache worse. 

She states she then had an episode of left facial numbness and pain which 

developed into left sided weakness. She states this happened yesterday and got 

progressively worse. She also had slurred speech and unable to hold down food 

but nursing reports she did eat her breakfast and lunch today. denies CP, SOB, 

abdominal pain, current N, V, +vision changes-blurred, +some difficulty with 

swallowing thin liquids.





Past Medical/Surgical History


Medical Problems:


(1) Chronic pain


Status: Acute  





(2) Chronic pain


Status: Acute  





(3) Depression


Status: Acute  





(4) Dysmenorrhea


Status: Acute  





(5) Hemiplegic migraine


Status: Acute  





(6) Migraine


Status: Acute  





(7) Pelvic pain


Status: Acute  





(8) Post traumatic stress disorder


Status: Acute  





(9) Serous otitis media


Status: Acute  





(10) Suicidal ideation


Status: Acute  





(11) Suicidal ideation


Status: Acute  





(12) Suicide gesture


Status: Acute  











Social History


Smoking Status:  Never smoker


Drug Use:  none


Marital Status:  in relationship


Housing Status:  lives with family





Allergies


Coded Allergies:  


     [place] (Verified  Allergy, Severe, VOMITING,ITCHY TONGUE,FACE SWELLING, 2/ 21/17)


     Macadamia Nut Oil (Verified  Allergy, Severe, RASH,VOMITING,ITCHY TONGUE,

FACE SWELLING, 2/21/17)


     Valproic Acid (Verified  Allergy, Severe, TONGUE SWELLING, 2/21/17)


     Amoxicillin (Verified  Allergy, Intermediate, BREAKOUT, 2/21/17)


     Ciprofloxacin (Verified  Allergy, Intermediate, WELTS/SWELLING, 2/21/17)


     Clavulanic Acid (Verified  Allergy, Intermediate, BREAKOUT, 2/21/17)


     Pineapple (Verified  Allergy, Intermediate, VOMITING, ITCHY TONGUE, 2/21/17

)


     Iodine (Verified  Adverse Reaction, Intermediate, VOMITING, 2/21/17)


     Erythromycin (Verified  Adverse Reaction, Mild, NAUSEA, 2/21/17)


     Iodinated Contrast Media (Verified  Adverse Reaction, Mild, VOMITING, 2/21/ 17)





Current Inpatient Medications





 Current Inpatient Medications








 Medications


  (Trade)  Dose


 Ordered  Sig/Evelia


 Route  Start Time


 Stop Time Status Last Admin


Dose Admin


 


 Ketorolac


 Tromethamine


  (Toradol Inj)  30 mg  Q6H  PRN


 IV  2/21/17 06:15


 2/26/17 06:14   


 


 


 Miscellaneous 1 ea  1 ea  PRN  PRN


 N/A  2/21/17 06:45


 2/21/18 06:44   


 


 


 Lactated Ringer's


  (Lr 1000ml)  1,000 ml @ 


 75 mls/hr  [id number] ONCE


 IV  2/21/17 07:00


 2/21/17 20:19  2/21/17 10:50


75 MLS/HR


 


 Enoxaparin Sodium


  (Lovenox Inj)  40 mg  Q24H


 SC  2/21/17 08:00


 3/23/17 07:59  2/21/17 10:51


40 MG


 


 Acetaminophen


  (Tylenol Tab)  650 mg  Q4H  PRN


 PO  2/21/17 06:45


 3/23/17 06:44   


 


 


 Nitroglycerin


  (Nitrostat Tab)  0.4 mg  UD  PRN


 SL  2/21/17 06:45


 3/23/17 06:44   


 


 


 Miscellaneous


 Information


  (Pharmacist


 Discharge Med Rec


 Consult)  1 ea  UD  PRN


 N/A  2/21/17 06:45


 3/23/17 06:44   


 


 


 Ibuprofen


  (Advil Tab)  400 mg  Q6H  PRN


 PO  2/21/17 07:00


 3/23/17 06:59   


 


 


 Ondansetron HCl


  (Zofran Inj)  4 mg  Q6H  PRN


 IV  2/21/17 07:00


 3/23/17 06:59   


 


 


 Hydroxyzine HCl


  (Vistaril Tab)  10 mg  Q6H  PRN


 PO  2/21/17 07:00


 3/23/17 06:59  2/21/17 10:52


10 MG


 


 Aspirin


  (Ecotrin Tab)  325 mg  QAM


 PO  2/22/17 09:00


 3/24/17 08:59   


 


 


 Baclofen


  (Lioresal Tab)  10 mg  TID


 PO  2/21/17 09:00


 3/23/17 08:59  2/21/17 14:00


10 MG


 


 Multivitamins


  (Multivitamin


 Tab)  1 tab  DAILY


 PO  2/21/17 09:00


 3/23/17 08:59  2/21/17 10:51


1 TAB


 


 Tramadol HCl


  (Ultram Tab)  50 mg  Q6H  PRN


 PO  2/21/17 07:00


 3/23/17 06:59   


 


 


 Pantoprazole


 Sodium


  (Protonix Tab)  20 mg  DAILY


 PO  2/21/17 09:00


 3/23/17 08:59  2/21/17 10:52


20 MG


 


 Prazosin HCl


  (Prazosin)  1 mg  HS


 PO  2/21/17 21:00


 3/23/17 20:59   


 











Physical Exam


Vital Signs (Past 24 Hrs):











  Date Time  Temp Pulse Resp B/P Pulse Ox O2 Delivery O2 Flow Rate FiO2


 


2/21/17 12:29     95 Room Air  


 


2/21/17 12:00 36.9 88 18 121/81 96 Room Air  


 


2/21/17 08:05  71 16 126/70 95 Room Air  


 


2/21/17 07:26  66      


 


2/21/17 05:38  77 19  96   


 


2/21/17 05:28    121/85    


 


2/21/17 05:11  101      


 


2/21/17 05:08  74 24  95   


 


2/21/17 04:58    108/62    


 


2/21/17 04:38  101 21  97   


 


2/21/17 04:37  82 16 127/79 98 Room Air  


 


2/21/17 04:34    127/79    


 


2/21/17 04:08  102 23  97   


 


2/21/17 03:58    121/73    


 


2/21/17 03:38  79 22  93   


 


2/21/17 03:28    100/66    


 


2/21/17 03:08  80 29  92   


 


2/21/17 03:03  80 23  93   


 


2/21/17 02:58    92/67    


 


2/21/17 02:33  71   94   


 


2/21/17 02:29    132/77    


 


2/21/17 02:03  81 17  96   


 


2/21/17 01:58    125/85    


 


2/21/17 01:44  87      


 


2/21/17 01:35    129/91    


 


2/21/17 01:34     98 Room Air  


 


2/21/17 01:34 37.4 84 20 129/91 98 Room Air  








Physical Exam:


Constitutional:  appearance nourished, healthy and obese


Ears, Nose, Mouth and Throat: mucous membranes moist, no injection and skin 

normal, eyes normal


Cardiovascular: normal S-1 and S-2 and regular rate and rhythm


Respiratory: clear to auscultation (CTA) and no rales, rhonchi or wheeze


Musculoskeletal: no peripheral edema and good distal pulses


Skin: no stigmata of neurocutaneous disease noted and normal and intact


Eyes: extraocular muscles intact (EOMI) and pupils equal, round and reactive to 

light (PERRL), good vascular pulsations, disc flat





NEUROLOGIC EXAMINATION: 


Mental status: 


Alert and interactive


Oriented to full date and location


Oriented to person


Speech fluent with no evidence of aphasia, talks like her tongue is thick but 

it is off and on not consistent 





Cranial Nerves


smile eye brow raise symmetric, tongue midline





Reflexes:


Deep tendon reflexes were symmetrical and graded 2/5.  Plantar responses were 

flexor.





Sensory: 


no deficit to cool or vibration





Coordination: 


lying in bed





Gait/Stance:


Posture lying in bed





Strength:


strength on right biceps triceps hand , hip flex plantar flex ext 5/5


                left unable to lift arm but when elevated in air maintains 


                                       leg left lifts slightly off bed but when 

elevated maintains for a minute





Laboratory Results


Past 24 Hours:


2/21/17 01:11








Red Blood Count 4.66, Mean Corpuscular Volume 87.1, Mean Corpuscular Hemoglobin 

29.6, Mean Corpuscular Hemoglobin Concent 34.0, Mean Platelet Volume 10.2, 

Neutrophils (%) (Auto) 71.1, Lymphocytes (%) (Auto) 20.8, Monocytes (%) (Auto) 

7.2, Eosinophils (%) (Auto) 0.4, Basophils (%) (Auto) 0.3, Neutrophils # (Auto) 

6.36, Lymphocytes # (Auto) 1.87, Monocytes # (Auto) 0.65, Eosinophils # (Auto) 

0.04, Basophils # (Auto) 0.03





2/21/17 01:11

















Test


  2/21/17


01:11 2/21/17


06:55 2/21/17


12:55


 


White Blood Count


  8.97 K/uL


(4.8-10.8) 


  


 


 


Red Blood Count


  4.66 M/uL


(4.2-5.4) 


  


 


 


Hemoglobin


  13.8 g/dL


(12.0-16.0) 


  


 


 


Hematocrit 40.6 % (37-47)   


 


Mean Corpuscular Volume


  87.1 fL


() 


  


 


 


Mean Corpuscular Hemoglobin


  29.6 pg


(25-34) 


  


 


 


Mean Corpuscular Hemoglobin


Concent 34.0 g/dl


(32-36) 


  


 


 


Platelet Count


  247 K/uL


(130-400) 


  


 


 


Mean Platelet Volume


  10.2 fL


(7.4-10.4) 


  


 


 


Neutrophils (%) (Auto) 71.1 %   


 


Lymphocytes (%) (Auto) 20.8 %   


 


Monocytes (%) (Auto) 7.2 %   


 


Eosinophils (%) (Auto) 0.4 %   


 


Basophils (%) (Auto) 0.3 %   


 


Neutrophils # (Auto)


  6.36 K/uL


(1.4-6.5) 


  


 


 


Lymphocytes # (Auto)


  1.87 K/uL


(1.2-3.4) 


  


 


 


Monocytes # (Auto)


  0.65 K/uL


(0.11-0.59) 


  


 


 


Eosinophils # (Auto)


  0.04 K/uL


(0-0.5) 


  


 


 


Basophils # (Auto)


  0.03 K/uL


(0-0.2) 


  


 


 


RDW Standard Deviation


  43.2 fL


(36.4-46.3) 


  


 


 


RDW Coefficient of Variation


  13.7 %


(11.5-14.5) 


  


 


 


Immature Granulocyte % (Auto) 0.2 %   


 


Immature Granulocyte # (Auto)


  0.02 K/uL


(0.00-0.02) 


  


 


 


D-Dimer


  920 ug/L FEU


(0-500) 


  


 


 


Anion Gap


  12.0 mmol/L


(3-11) 


  


 


 


Est Creatinine Clear Calc


Drug Dose 90.6 ml/min 


  


  


 


 


Estimated GFR (


American) 81.0 


  


  


 


 


Estimated GFR (Non-


American 69.9 


  


  


 


 


BUN/Creatinine Ratio 9.9 (10-20)   


 


Calcium Level


  8.7 mg/dl


(8.5-10.1) 


  


 


 


Total Bilirubin


  0.7 mg/dl


(0.2-1) 


  


 


 


Aspartate Amino Transf


(AST/SGOT) 17 U/L (15-37) 


  


  


 


 


Alanine Aminotransferase


(ALT/SGPT) 26 U/L (12-78) 


  


  


 


 


Alkaline Phosphatase


  70 U/L


() 


  


 


 


Total Protein


  7.7 gm/dl


(6.4-8.2) 


  


 


 


Albumin


  4.0 gm/dl


(3.4-5.0) 


  


 


 


Globulin


  3.7 gm/dl


(2.5-4.0) 


  


 


 


Albumin/Globulin Ratio 1.1 (0.9-2)   


 


Thyroid Stimulating Hormone


(TSH) 2.370 uIu/ml


(0.300-4.500) 


  


 


 


Chemistry Specimen Hemolysis    


 


Triglycerides Level


  


  51 mg/dl


(0-150) 


 


 


Cholesterol Level


  


  160 mg/dl


(0-200) 


 


 


HDL Cholesterol  37 mg/dl  


 


LDL Cholesterol, Calculated  113 mg/dl  


 


VLDL Cholesterol, Calculated  10 mg/dl  


 


Cholesterol/HDL Ratio  4.3  


 


Lyme Disease IgM Antibody  NEG (NEG)  


 


Troponin I


  


  


  < 0.015 ng/ml


(0-0.045)











Imaging


CT head-no intracranial abnormalities





Impression


41 year old female s/p headache with left sided weakness





Plan


1. CT head with no abnormalities


2. continue baclofen 10 mg TID


3. MRI with and without brain pending


4. aspirin 81 mg added should add plavix is stroke is suspected


5. carotid doppler 


6. echocardiogram if stroke is identified


7. this may be complex migraine. however she has been tried on multiple 

medication which none have given relief


8. recommend referral to [facility] in [place] headache specialist and 

combination of pain mgt where she is already established care


9. avoid narcotics for headache management





further recommendations to follow once MRI is completed





I have seen and discussed above patient with Dr Israel Vargas, neurology





I know this woman from one outpatient visit and her recent stay on [address] [place] [address]  she 

has longstanding migraines and has failed on over 40 medications and now has 

developed side effects and intolerance to botox after an initial favorable 

response to local injections.  No in for what may be a recurrent hemiplegic 

migraine involving left face arm and leg but the exam is to some degree 

inconsistent and has a number of nonorganic features  awaiting mri and mra 

which I suspect will be negative.  Treatment and disposition my well be 

problematical and suspect early discharge will not happen Would go with 

nonnarcotic analgesia, steroids if symptoms of headache persist tomorrow and 

will need speech pt ot and perhaps a stay at [facility] if the paresis does not 

"resolve" agree with need for psychiatry and in the future referral out to a 

tertiary headache center depending on insurance.  Israel Vargas MD

## 2017-02-21 NOTE — DIAGNOSTIC IMAGING REPORT
BILATERAL LOWER EXTREMITY VENOUS DOPPLER



HISTORY:      Elevated d dimer- rule out DVT



COMPARISON STUDY:  Venous Doppler 1/7/2011.



FINDINGS: There is normal compressibility, flow, and augmentation within the

bilateral lower extremity deep venous systems.



IMPRESSION:  

No DVT within the right or left lower extremity.







Electronically signed by:  Darrian Dumont M.D.

2/21/2017 5:26 PM



Dictated Date/Time:  2/21/2017 5:26 PM

## 2017-02-21 NOTE — DIAGNOSTIC IMAGING REPORT
CT HEAD WITHOUT CONTRAST (CT)



CLINICAL HISTORY: Change in mental status. Stroke like symptoms.    



COMPARISON STUDY:  12/26/2016



TECHNIQUE:  Axial CT of the brain is performed from the vertex to the skull

base. IV contrast was not administered for this examination.



CT DOSE: 537.48 mGy.cm



FINDINGS:



No intra or extra-axial mass lesions are visualized. There is no CT evidence of

acute cortical infarction. There is no evidence of midline shift. There is no

acute  hemorrhage. No calvarial fractures are visualized. 

There is minor frontal lobe atrophy.

There is no evidence of pathologic ventricular dilatation.

There is no evidence of acute sinusitis



IMPRESSION: No acute intracranial findings







Electronically signed by:  Aj Zendejas M.D.

2/21/2017 6:31 AM



Dictated Date/Time:  2/21/2017 6:30 AM

## 2017-02-21 NOTE — DIAGNOSTIC IMAGING REPORT
Brain MRA



HISTORY: Mental status change  Stroke - Attention to [place]



TECHNIQUE: 3-D time-of-flight MRA of the brain was performed without contrast.



COMPARISON STUDY:  None.



FINDINGS: Visualized intracranial internal carotid arteries, distal vertebral

arteries, and basilar artery are widely patent. There is no significant

stenosis, occlusion, or aneurysm seen within the bilateral ACAs, MCAs, or PCAs..

Posterior cerebral vasculature is fed at least in part by patent posterior

communicating vessels.



IMPRESSION:  

No significant stenosis, occlusion, or aneurysm within the [place]. 







Electronically signed by:  León Alegria M.D.

2/21/2017 4:31 PM



Dictated Date/Time:  2/21/2017 4:29 PM

## 2017-02-21 NOTE — DIAGNOSTIC IMAGING REPORT
Brain MRI WITHOUT CONTRAST



HISTORY:      Left-sided weakness.



TECHNIQUE: Multiplanar multisequence MRI of the brain was performed without the

use of contrast.



COMPARISON STUDY:  None.



FINDINGS: There are no areas of restricted diffusion to suggest acute

infarction. The midline structures are intact. The paranasal sinuses are clear.

The mastoid air cells are clear. The ventricles and sulci are within normal

limits for age. There is no mass, hematoma, midline shift. The major vascular

flow-voids at the skull base are well maintained.



IMPRESSION:  

No acute intracranial abnormality. 







Electronically signed by:  Darrian Dumont M.D.

2/21/2017 5:04 PM



Dictated Date/Time:  2/21/2017 5:01 PM
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
## 2017-02-22 NOTE — NEUROLOGY PROGRESS NOTES
Neurology Progress Note


Date of Service


Feb 22, 2017.





Mell Hi is a 41 year old female who has a PMH PTSD, fibromyalgia, mood disorder, 

anxiety ,borderline personality disorder, migraines. She states that about 15 

days ago she had a botox injection which she states made her headache worse. 

She states she then had an episode of left facial numbness and pain which 

developed into left sided weakness. She states this happened yesterday and got 

progressively worse. She also had slurred speech and unable to hold down food 

but nursing reports she did eat her breakfast and lunch today. denies CP, SOB, 

abdominal pain, current N, V, +vision changes-blurred, +some difficulty with 

swallowing thin liquids.


Today she is sitting up in bed and eating her lunch. She has been up walking 60 

ft with PT and walked and did ADLs with OT. She states she is still having pain 

but it is no worse than her normal pain. Denies CP, SOB, abdominal pain, one 

sided weakness, numbness tingling, N, V.





Objective











  Date Time  Temp Pulse Resp B/P Pulse Ox O2 Delivery O2 Flow Rate FiO2


 


2/22/17 12:00      Room Air  


 


2/22/17 11:55  103 18 101/56 95 Room Air  


 


2/22/17 08:00     95 Room Air  


 


2/22/17 07:49 36.5 81 16 116/71 95 Room Air  


 


2/22/17 04:00     95 Room Air  


 


2/22/17 03:51 36.6 80 18 119/75 95 Room Air  


 


2/22/17 00:00     95 Room Air  


 


2/21/17 23:17 36.9 88 16 121/74 93 Room Air  


 


2/21/17 20:00     95 Room Air  


 


2/21/17 18:21 36.8 81 18 124/76 95 Room Air  


 


2/21/17 18:18      Room Air  











Last 24 Hours








Test


  2/21/17


18:00 2/22/17


06:36


 


Urine Color YELLOW  


 


Urine Appearance CLOUDY  


 


Urine pH 5.0  


 


Urine Specific Gravity 1.019  


 


Urine Protein NEG  


 


Urine Glucose (UA) NEG  


 


Urine Ketones TRACE  


 


Urine Occult Blood NEG  


 


Urine Nitrite NEG  


 


Urine Bilirubin NEG  


 


Urine Urobilinogen NEG  


 


Urine Leukocyte Esterase TRACE  


 


Urine WBC (Auto) 1-5 /hpf  


 


Urine RBC (Auto) 5-10 /hpf  


 


Urine Hyaline Casts (Auto) 5-10 /lpf  


 


Urine Epithelial Cells (Auto) >30 /lpf  


 


Urine Bacteria (Auto) 1+  


 


Urine Pregnancy Test NEG  


 


Urine Opiates Screen NEG  


 


Urine Methadone, Qualitative NEG  


 


Urine Barbiturates NEG  


 


Urine Phencyclidine (PCP)


Level NEG 


  


 


 


Ur


Amphetamine/Methamphetamine NEG 


  


 


 


MDMA (Ecstasy) Screen NEG  


 


Urine Benzodiazepines Screen NEG  


 


Urine Cocaine Metabolite NEG  


 


Urine Marijuana (THC) POS  


 


White Blood Count  11.88 K/uL 


 


Red Blood Count  3.82 M/uL 


 


Hemoglobin  11.4 g/dL 


 


Hematocrit  34.0 % 


 


Mean Corpuscular Volume  89.0 fL 


 


Mean Corpuscular Hemoglobin  29.8 pg 


 


Mean Corpuscular Hemoglobin


Concent 


  33.5 g/dl 


 


 


Platelet Count  273 K/uL 


 


Mean Platelet Volume  9.7 fL 


 


Neutrophils (%) (Auto)  77.7 % 


 


Lymphocytes (%) (Auto)  14.6 % 


 


Monocytes (%) (Auto)  7.2 % 


 


Eosinophils (%) (Auto)  0.0 % 


 


Basophils (%) (Auto)  0.2 % 


 


Neutrophils # (Auto)  9.24 K/uL 


 


Lymphocytes # (Auto)  1.74 K/uL 


 


Monocytes # (Auto)  0.85 K/uL 


 


Eosinophils # (Auto)  0.00 K/uL 


 


Basophils # (Auto)  0.02 K/uL 


 


RDW Standard Deviation  45.3 fL 


 


RDW Coefficient of Variation  13.9 % 


 


Immature Granulocyte % (Auto)  0.3 % 


 


Immature Granulocyte # (Auto)  0.03 K/uL 


 


Sodium Level  141 mmol/L 


 


Potassium Level  3.6 mmol/L 


 


Chloride Level  107 mmol/L 


 


Carbon Dioxide Level  25 mmol/L 


 


Anion Gap  9.0 mmol/L 


 


Blood Urea Nitrogen  11 mg/dl 


 


Creatinine  0.86 mg/dl 


 


Est Creatinine Clear Calc


Drug Dose 


  106.7 ml/min 


 


 


Estimated GFR (


American) 


  97.3 


 


 


Estimated GFR (Non-


American 


  83.9 


 


 


BUN/Creatinine Ratio  12.9 


 


Random Glucose  101 mg/dl 


 


Calcium Level  8.3 mg/dl 








Imaging:


MRI brain no contrast-No acute intracranial abnormality


MRA brain -No significant stenosis, occlusion, or aneurysm within the [place] of 

[place]. 


TTE- 


* The left ventricular wall motion is normal.


* There is mild concentric left ventricular hypertrophy.


* Left ventricular systolic function is normal.


* The LV Ejection Fraction = 60-65%.


* The LV diastolic function is normal.


* There is no pericardial effusion.


* NO ASD


doppler LE- No DVT within the right or left lower extremity.


Exam:


Physical Exam:


Constitutional:  appearance nourished, healthy and normal


Ears, Nose, Mouth and Throat: mucous membranes moist, no injection and skin 

normal, eyes normal


Cardiovascular: normal S-1 and S-2 and regular rate and rhythm


Respiratory: clear to auscultation (CTA) and no rales, rhonchi or wheeze


Musculoskeletal: no peripheral edema


Skin: no stigmata of neurocutaneous disease noted and normal and intact


Eyes: extraocular muscles intact (EOMI) and pupils equal, round and reactive to 

light (PERRL)





NEUROLOGIC EXAMINATION: 


Mental status: 


Alert and interactive


Oriented to full date and location


Oriented to person


Speech fluent with no evidence of aphasia, talking like she has a thick tongue





Cranial Nerves


no asymmetry to face





Coordination: 


finger to nose with no bi pass or tremor





Gait/Stance:


Posture normal.  Gait normal: with steady with steps, base, turning, tandem 

gait.





Motor:


Negative for pronator drift of out stretched arms with eyes closed.





Strength:


biceps triceps deltoids hand  intrinsics 5/5 bilaterally





 Current Inpatient Medications








 Medications


  (Trade)  Dose


 Ordered  Sig/Evelia


 Route  Start Time


 Stop Time Status Last Admin


Dose Admin


 


 Ketorolac


 Tromethamine


  (Toradol Inj)  30 mg  Q6H  PRN


 IV  2/21/17 06:15


 2/26/17 06:14   


 


 


 Miscellaneous


  (Iv Fluids


 Completed)  1 ea  PRN  PRN


 N/A  2/21/17 06:45


 2/21/18 06:44   


 


 


 Enoxaparin Sodium


  (Lovenox Inj)  40 mg  Q24H


 SC  2/21/17 08:00


 3/23/17 07:59  2/22/17 08:25


40 MG


 


 Acetaminophen


  (Tylenol Tab)  650 mg  Q4H  PRN


 PO  2/21/17 06:45


 3/23/17 06:44   


 


 


 Nitroglycerin


  (Nitrostat Tab)  0.4 mg  UD  PRN


 SL  2/21/17 06:45


 3/23/17 06:44   


 


 


 Miscellaneous


 Information


  (Pharmacist


 Discharge Med Rec


 Consult)  1 ea  UD  PRN


 N/A  2/21/17 06:45


 3/23/17 06:44   


 


 


 Ibuprofen


  (Advil Tab)  400 mg  Q6H  PRN


 PO  2/21/17 07:00


 3/23/17 06:59   


 


 


 Ondansetron HCl


  (Zofran Inj)  4 mg  Q6H  PRN


 IV  2/21/17 07:00


 3/23/17 06:59  2/21/17 18:46


4 MG


 


 Hydroxyzine HCl


  (Vistaril Tab)  10 mg  Q6H  PRN


 PO  2/21/17 07:00


 3/23/17 06:59  2/22/17 00:18


10 MG


 


 Baclofen


  (Lioresal Tab)  10 mg  TID


 PO  2/21/17 09:00


 3/23/17 08:59  2/22/17 08:26


10 MG


 


 Multivitamins


  (Multivitamin


 Tab)  1 tab  DAILY


 PO  2/21/17 09:00


 3/23/17 08:59  2/22/17 08:26


1 TAB


 


 Tramadol HCl


  (Ultram Tab)  50 mg  Q6H  PRN


 PO  2/21/17 07:00


 3/23/17 06:59  2/22/17 08:30


50 MG


 


 Pantoprazole


 Sodium


  (Protonix Tab)  20 mg  DAILY


 PO  2/21/17 09:00


 3/23/17 08:59  2/22/17 08:27


20 MG


 


 Prazosin HCl


  (Prazosin)  1 mg  HS


 PO  2/21/17 21:00


 3/23/17 20:59  2/21/17 21:13


1 MG











Impression


41 year old female s/p headache with left sided weakness





Plan


1. CT head with no abnormalities


2. continue baclofen 10 mg TID


3. MRI with and without brain no acute abnormalities


4. aspirin 81 mg added would continue for lifetime


5. MRA brain no acute findings


6. echocardiogram no ASD


7. this may be complex migraine. however she has been tried on multiple 

medication which none have given relief


8. recommend referral to [facility] headache specialist and combination of pain mgt 

where she is already established care


9. avoid narcotics for headache management








I have seen and discussed above patient with Dr Israel Vargas, neurology





Imaaging negative and patient improving but headache still an issue  if stable 

she could be discharged and follow up with pain clinic as she does hav an 

appointment for this on March first  exam continues to show the lef facial 

weakness mild dysarthria but improved left arm and face strength and she did 

walk well in pt so doubt that rehab will get involved  in the future she may 

need a tertiary [facility] clinic and her p  may cover the [facility] 

clinic in [facility] ( she has family that reside near there so the location 

may be very favorable ) Continue nonnarcotic analgesia if possible and 

hopefully cnan be discharged katie Vargas MD
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
## 2017-02-23 NOTE — DISCHARGE INSTRUCTIONS
Discharge Instructions


Admission


Reason for Admission:  Weakness Of Left Side Of Body





Discharge


Discharge Diagnosis / Problem:  1. Complicated migraine





Discharge Goals


Goal(s):  Improve disease control





Activity Recommendations


Activity Limitations:  resume your previous activity (as tolerated prior to 

admission)





.





Instructions / Follow-Up


Instructions / Follow-Up


MEDICATION CHANGES:


1. New medication: Aspirin 81 mg needs to be continued life long





FOLLOW UP


1. With PCP -Dr Keo Carr 2/27/17 at 2:10 PM


2. Follow up with Neurology as per schedule


3. Follow up with pain management as per schedule


4. Follow up with psychiatry as per schedule





Neurology recommends [facility] vs [facility] in 

[place] for refractory migraine headaches





Current Hospital Diet


Patient's current hospital diet: AHA Diet (Heart Healthy)





Discharge Diet


Recommended Diet:  AHA Diet (Heart Healthy)





Pending Studies


Studies pending at discharge:  no





Laboratory Results





 Lipid Panel








Test


  2/21/17


06:55 Range/Units


 


 


Triglycerides Level 51  0-150  mg/dl


 


Cholesterol Level 160  0-200  mg/dl


 


HDL Cholesterol 37   mg/dl


 


Cholesterol/HDL Ratio 4.3   


 


LDL Cholesterol, Calculated 113   mg/dl











Medical Emergencies








.


Who to Call and When:





Medical Emergencies:  If at any time you feel your situation is an emergency, 

please call 911 immediately.





.





Non-Emergent Contact


Non-Emergency issues call your:  Primary Care Provider





.


.





"Provider Documentation" section prepared by Lakshmi Teresa.





VTE Core Measure


Inpt VTE Proph given/why not?:  Enoxaparin (Lovenox)SQ

## 2017-02-23 NOTE — DISCHARGE SUMMARY
Discharge Summary


Date of Service


Feb 23, 2017.





Discharge Summary


Admission Date:


Feb 21, 2017 at 06:32


Discharge Date:  Feb 23, 2017


Discharge Disposition:  Home


Principal Diagnosis:


1. Complicated migraine


Secondary Diagnoses/Problems:


1. Fibromyalgia


2. Mood disorder


Procedures:


Tele monitoring


CT scan head


MRI brain


MRA head


Echocardiogram


Lipid panel


PT/OT


Consultations:


1. Neurology


2. Psychiatry


3. PT/OT


Pending Studies/Follow-Up:


Instructions / Follow-Up


MEDICATION CHANGES:


1. New medication: Aspirin 81 mg needs to be continued life long





FOLLOW UP


1. With PCP -Dr Keo Carr 2/27/17 at 2:10 PM


2. Follow up with Neurology as per schedule


3. Follow up with pain management as per schedule


4. Follow up with psychiatry as per schedule





Neurology recommends [facility] vs [facility] in 

[place] for refractory migraine headaches





Medication Reconciliation


New Medications:  


Aspirin (Aspirin Ec) 81 Mg Tab


81 MG PO DAILY for 30 Days, #30 TAB





 


Continued Medications:  


Baclofen (Baclofen) 10 Mg Tab


10 MG PO TID, #90 TABS





Cholecalciferol (Vitamin D) 2,000 Unit Tab


2000 INTER.UNIT PO DAILY





Hydroxyzine Hcl (Atarax) 50 Mg Tab


50 MG PO HS, TAB





Loperamide Hcl (Imodium) 2 Mg Cap


2 TABS PO PRN, CAP





Magnesium Oxide (Magnesium-Oxide) 400 Mg Tab


400 MG PO QAM, #1 TAB


Patients gets over the counter


Multivitamin (Multivitamin)  Tab


1 TAB PO DAILY, TAB





Omeprazole (Prilosec) 20 Mg Capcr


20 MG PO DAILY, CAP





Ondansetron Hcl (Zofran) 4 Mg Tab


4 MG PO UD PRN for Nausea, TAB





Prazosin HCl (Prazosin HCl) 1 Mg Cap


1 MG PO HS for 30 Days, #30 CAP





Tramadol Hcl (Ultram) 50 Mg Tab


50 MG PO Q6H PRN for Pain, TAB











Admission Information


HPI (per Admitting provider):


HISTORY OF PRESENT ILLNESS:  





Medical history significant for chronic migraine, mood disorder,


history of mini-strokes as per patient, DVT status post


anticoagulation, hx seizures as per px.


History of fibromyalgia as per records.





Recent confinement at the [facility] [place] last December 2016 for depression.  


Patient was seen by Neurology for chronic headache


symptoms during confinement.


No further imaging needed.  Full workup outpx for headaches. 





About 2 weeks ago patient had Botox injection at outpatient 


[facility] for migraine.  


Migraine headache worse after symptoms.  


Px admits to being very depressed because of intractable migraine.  


Denies suicidal ideation.  





A few days later, she noted that she noted decreased hearing sx.


"I feel like I'm in a tunnel."


Intermittent blurred vision, both eyes.  


Last night, while showering, she noted left-sided weakness, arm, and leg.


Her L face, mouth and tongue felt funny.  


Px think she's had a previous episode in the past but not this bad.





Patient had some fleeting chest pain, shortness of breath.  


No cough.  





Patient was brought to Emergency Room.  





Patient stopped aspirin 2 years ago as per family doctor advice as per px 


because he wanted her to be in a minimal number of pills.





Hospital Course





ASSESSMENT AND PLAN :





HEADACHE (MIGRAINE) /LEFT SIDED WEAKNESS/DECREASED SENSATION -Resolved


-Stroke ruled out. Possibly related to complicated migraine


-Unclear if true weakness  as was able to use extremities when no one was 

watching her while having her lunch and today also was using her cell phone, 

but when asked to lift it up was unable to do so. 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 

personality disorder.  Able to ambulate well now, participated in PT.


-Work up- CT head- neg, MRI brain- Negative, MRA head- Negative


-ASA 81 mg lifetime per neurology


-PT/OT ordered- cleared for discharge to home





PLAN:  Per neurology, may need referral to [facility] or 

[facility] in [place] where she has family. 


Continue with ASA 81 mg daily life long. Continue with pain mx clinic visits.





MIGRAINE


Hx of migraines. Received botox injection recently but says headaches worsened 

after that. Has had difficulties in controlling it in past - tried multiple 

medications with no benefit. 


-Toradol PRN


-Neurology on board- PLAN AS ABOVE --> Refer to Headache specialist + Pain mx 

for control of her symptoms





? CHEST PAIN


Did not mention to me about this. No complaints.


-Hx of DVT s/p anticoagulation, D dimer 920, but no signs of hypoxia/Tachycardia


-Venous duplex-= negative for DVT





PTSD/Mood disorder/Fibromyalgia/Hx of benzo abuse


-Multiple issues, probably contributing to current symptoms, however, will rule 

out pathological issues


-Psychiatry consulted - no suicidal ideation, no indication for inpatient 

admission --> no changes in medications recommended at this point. Appreciate 

inputs





PT OT evaluation


-Was able to walk and cleared for discharge to home





DVT prophylaxis with Lovenox subQ.  





Full code.


 


DISPOSITION


PT/OT ordered- cleared for discharge home


Did not participate with PT today but I think it had more to do with her mood 

than not able to ambulate, as did well yesterday





OK TO DISCHARGE HOME TODAY


Total time spent on discharge = 32 minutes


This includes examination of the patient, discharge planning, medication 

reconciliation, and communication with other providers.





Discharge Instructions


Discharge Diagnosis / Problem:  1. Complicated migraine





Discharge Goals


Goal(s):  Improve disease control





Activity Recommendations


Activity Limitations:  resume your previous activity (as tolerated prior to 

admission)





.





Instructions / Follow-Up


Instructions / Follow-Up


MEDICATION CHANGES:


1. New medication: Aspirin 81 mg needs to be continued life long





FOLLOW UP


1. With PCP -Dr Keo Carr 2/27/17 at 2:10 PM


2. Follow up with Neurology as per schedule


3. Follow up with pain management as per schedule


4. Follow up with psychiatry as per schedule





Neurology recommends [facility] vs [facility] in 

[place] for refractory migraine headaches





Current Hospital Diet


Patient's current hospital diet: AHA Diet (Heart Healthy)





Discharge Diet


Recommended Diet:  AHA Diet (Heart Healthy)





Pending Studies


Studies pending at discharge:  no





Laboratory Results





 Lipid Panel








Test


  2/21/17


06:55 Range/Units


 


 


Triglycerides Level 51  0-150  mg/dl


 


Cholesterol Level 160  0-200  mg/dl


 


HDL Cholesterol 37   mg/dl


 


Cholesterol/HDL Ratio 4.3   


 


LDL Cholesterol, Calculated 113   mg/dl











Medical Emergencies








.


Who to Call and When:





Medical Emergencies:  If at any time you feel your situation is an emergency, 

please call 911 immediately.





.





Non-Emergent Contact


Non-Emergency issues call your:  Primary Care Provider





.


.





"Provider Documentation" section prepared by Lakshmi Teresa.





VTE Core Measure


Inpt VTE Proph given/why not?:  Enoxaparin (Lovenox)SQ

## 2018-03-12 ENCOUNTER — HOSPITAL ENCOUNTER (OUTPATIENT)
Dept: HOSPITAL 45 - C.RADBC | Age: 43
Discharge: HOME | End: 2018-03-12
Attending: PHYSICIAN ASSISTANT
Payer: COMMERCIAL

## 2018-03-12 DIAGNOSIS — M54.2: Primary | ICD-10-CM

## 2018-03-12 NOTE — DIAGNOSTIC IMAGING REPORT
C-SPINE ROUTINE 4 OR 5 VIEWS



HISTORY: Pain.  CERVICALGIA



COMPARISON: None.



FINDINGS: The cervical spine is visualized from C1 through the superior endplate

of T1. There is no fracture.  No subluxation. Moderate degenerative disc change

from C5 through C7 Prevertebral soft tissues and the atlantodens interval are

intact.



IMPRESSION:  

Moderate degenerative change of the low cervical spine. No acute process.











The above report was generated using voice recognition software.  It may contain

grammatical, syntax or spelling errors.







Electronically signed by:  León Alegria M.D.

3/12/2018 3:34 PM



Dictated Date/Time:  3/12/2018 3:33 PM